# Patient Record
Sex: FEMALE | Race: WHITE | URBAN - METROPOLITAN AREA
[De-identification: names, ages, dates, MRNs, and addresses within clinical notes are randomized per-mention and may not be internally consistent; named-entity substitution may affect disease eponyms.]

---

## 2018-04-13 ENCOUNTER — OFFICE VISIT (OUTPATIENT)
Dept: FAMILY MEDICINE CLINIC | Age: 38
End: 2018-04-13

## 2018-04-13 VITALS
OXYGEN SATURATION: 99 % | RESPIRATION RATE: 20 BRPM | HEART RATE: 78 BPM | SYSTOLIC BLOOD PRESSURE: 110 MMHG | HEIGHT: 62 IN | DIASTOLIC BLOOD PRESSURE: 80 MMHG | TEMPERATURE: 97.9 F | WEIGHT: 174 LBS | BODY MASS INDEX: 32.02 KG/M2

## 2018-04-13 DIAGNOSIS — Z83.49 FAMILY HISTORY OF THYROID DISEASE: ICD-10-CM

## 2018-04-13 DIAGNOSIS — Z00.00 ROUTINE GENERAL MEDICAL EXAMINATION AT A HEALTH CARE FACILITY: ICD-10-CM

## 2018-04-13 DIAGNOSIS — E78.00 PURE HYPERCHOLESTEROLEMIA: ICD-10-CM

## 2018-04-13 DIAGNOSIS — E66.09 CLASS 1 OBESITY DUE TO EXCESS CALORIES WITHOUT SERIOUS COMORBIDITY WITH BODY MASS INDEX (BMI) OF 31.0 TO 31.9 IN ADULT: ICD-10-CM

## 2018-04-13 DIAGNOSIS — E78.00 PURE HYPERCHOLESTEROLEMIA: Primary | ICD-10-CM

## 2018-04-13 DIAGNOSIS — H93.19 TINNITUS, UNSPECIFIED LATERALITY: ICD-10-CM

## 2018-04-13 NOTE — PATIENT INSTRUCTIONS
Neck Strain or Sprain: Rehab Exercises  Your Care Instructions  Here are some examples of typical rehabilitation exercises for your condition. Start each exercise slowly. Ease off the exercise if you start to have pain. Your doctor or physical therapist will tell you when you can start these exercises and which ones will work best for you. How to do the exercises  Neck rotation    1. Sit in a firm chair, or stand up straight. 2. Keeping your chin level, turn your head to the right, and hold for 15 to 30 seconds. 3. Turn your head to the left and hold for 15 to 30 seconds. 4. Repeat 2 to 4 times to each side. Neck stretches    1. Look straight ahead, and tip your right ear to your right shoulder. Do not let your left shoulder rise up as you tip your head to the right. 2. Hold for 15 to 30 seconds. 3. Tilt your head to the left. Do not let your right shoulder rise up as you tip your head to the left. 4. Hold for 15 to 30 seconds. 5. Repeat 2 to 4 times to each side. Forward neck flexion    1. Sit in a firm chair, or stand up straight. 2. Bend your head forward. 3. Hold for 15 to 30 seconds. 4. Repeat 2 to 4 times. Lateral (side) bend strengthening    1. With your right hand, place your first two fingers on your right temple. 2. Start to bend your head to the side while using gentle pressure from your fingers to keep your head from bending. 3. Hold for about 6 seconds. 4. Repeat 8 to 12 times. 5. Switch hands and repeat the same exercise on your left side. Forward bend strengthening    1. Place your first two fingers of either hand on your forehead. 2. Start to bend your head forward while using gentle pressure from your fingers to keep your head from bending. 3. Hold for about 6 seconds. 4. Repeat 8 to 12 times. Neutral position strengthening    1. Using one hand, place your fingertips on the back of your head at the top of your neck.   2. Start to bend your head backward while using gentle pressure from your fingers to keep your head from bending. 3. Hold for about 6 seconds. 4. Repeat 8 to 12 times. Chin tuck    1. Lie on the floor with a rolled-up towel under your neck. Your head should be touching the floor. 2. Slowly bring your chin toward your chest.  3. Hold for a count of 6, and then relax for up to 10 seconds. 4. Repeat 8 to 12 times. Follow-up care is a key part of your treatment and safety. Be sure to make and go to all appointments, and call your doctor if you are having problems. It's also a good idea to know your test results and keep a list of the medicines you take. Where can you learn more? Go to http://ben-rupinder.info/. Enter M679 in the search box to learn more about \"Neck Strain or Sprain: Rehab Exercises. \"  Current as of: March 21, 2017  Content Version: 11.4  © 5964-1822 Healthwise, Incorporated. Care instructions adapted under license by [employer] (which disclaims liability or warranty for this information). If you have questions about a medical condition or this instruction, always ask your healthcare professional. [address] any warranty or liability for your use of this information.

## 2018-04-13 NOTE — PROGRESS NOTES
Maria Del Carmen Aceves is a 44 y.o. female (: 1980) presenting to address:    Chief Complaint   Patient presents with    Cholesterol Problem     Pure hypercholesterolemia       Vitals:    18 0748   BP: 110/80   Pulse: 78   Resp: 20   Temp: 97.9 °F (36.6 °C)   TempSrc: Oral   SpO2: 99%   Weight: 174 lb (78.9 kg)   Height: 5' 2\" (1.575 m)   PainSc:   0 - No pain       Hearing/Vision:      Visual Acuity Screening    Right eye Left eye Both eyes   Without correction: 20/15-1 20/15-1 20/13-1   With correction:          Learning Assessment:     Learning Assessment 2018   PRIMARY LEARNER Patient   HIGHEST LEVEL OF EDUCATION - PRIMARY LEARNER  4 YEARS OF COLLEGE   BARRIERS PRIMARY LEARNER NONE   CO-LEARNER CAREGIVER No   PRIMARY LANGUAGE ENGLISH    NEED No   LEARNER PREFERENCE PRIMARY READING   LEARNING SPECIAL TOPICS none   ANSWERED BY patient   RELATIONSHIP SELF   ASSESSMENT COMMENT n/a     Depression Screening:     PHQ over the last two weeks 2018   Little interest or pleasure in doing things Not at all   Feeling down, depressed or hopeless Not at all   Total Score PHQ 2 0     Fall Risk Assessment:     Fall Risk Assessment, last 12 mths 2018   Able to walk? Yes   Fall in past 12 months? No     Abuse Screening:     Abuse Screening Questionnaire 2018   Do you ever feel afraid of your partner? N   Are you in a relationship with someone who physically or mentally threatens you? N   Is it safe for you to go home? Y     Coordination of Care Questionaire:   1. Have you been to the ER, urgent care clinic since your last visit? Hospitalized since your last visit? YES [facility] 2018    2. Have you seen or consulted any other health care providers outside of the [address] [place] [address] since your last visit? Include any pap smears or colon screening. NO    Advanced Directive:   1. Do you have an Advanced Directive? NO    2. Would you like information on Advanced Directives?  YES

## 2018-04-13 NOTE — MR AVS SNAPSHOT
[address] [address] [facility] [id number] [phone] Patient: Tiffany Rashid MRN: [id number]:[id number] Visit Information Date & Time Provider Department Dept. Phone Encounter #  
 4/13/2018  7:45 AM Irwin Pulido, Brook Bardales Ramírez [phone] Upcoming Health Maintenance Date Due Influenza Age 5 to Adult 9/1/2018* PAP AKA CERVICAL CYTOLOGY 4/1/2021 DTaP/Tdap/Td series (2 - Td) 1/1/2024 *Topic was postponed. The date shown is not the original due date. Allergies as of 4/13/2018  Review Complete On: 4/13/2018 By: Irwin Pulido MD  
 No Known Allergies Current Immunizations  Reviewed on 8/22/2012 Name Date Hepatitis B Vaccine 4/22/2012 Influenza Vaccine Whole 8/22/2011 PPD 8/22/2004 TD Vaccine 8/22/2010 Not reviewed this visit You Were Diagnosed With   
  
 Codes Comments Pure hypercholesterolemia    -  Primary ICD-10-CM: E78.00 ICD-9-CM: 272.0 Routine general medical examination at a health care facility     ICD-10-CM: Z00.00 ICD-9-CM: V70.0 Class 1 obesity due to excess calories without serious comorbidity with body mass index (BMI) of 31.0 to 31.9 in adult     ICD-10-CM: E66.09, Z68.31 
ICD-9-CM: 278.00, V85.31 Tinnitus, unspecified laterality     ICD-10-CM: H93.19 ICD-9-CM: 388.30 Vitals BP Pulse Temp Resp Height(growth percentile) Weight(growth percentile) 110/80 (BP 1 Location: Left arm, BP Patient Position: Sitting) 78 97.9 °F (36.6 °C) (Oral) 20 5' 2\" (1.575 m) 174 lb (78.9 kg) SpO2 BMI OB Status Smoking Status 99% 31.83 kg/m2 IUD Never Smoker Vitals History BMI and BSA Data Body Mass Index Body Surface Area  
 31.83 kg/m 2 1.86 m 2 Preferred Pharmacy Pharmacy Name Phone [facility] [address] [place] [address] [facility] [phone] Your Updated Medication List  
  
Notice  As of 4/13/2018  8:10 AM  
 You have not been prescribed any medications. We Performed the Following REFERRAL TO ENT-OTOLARYNGOLOGY [TYA31 Custom] To-Do List   
 04/13/2018 Lab:  CBC W/O DIFF   
  
 04/13/2018 Lab:  LIPID PANEL   
  
 04/13/2018 Lab:  METABOLIC PANEL, COMPREHENSIVE Referral Information Referral ID Referred By Referred To  
  
 [id number] [address] Sanchez [facility] 2018 [address] Phone: [phone] Fax: [phone] Visits Status Start Date End Date 1 New Request 4/13/18 4/13/19 If your referral has a status of pending review or denied, additional information will be sent to support the outcome of this decision. Patient Instructions Neck Strain or Sprain: Rehab Exercises Your Care Instructions Here are some examples of typical rehabilitation exercises for your condition. Start each exercise slowly. Ease off the exercise if you start to have pain. Your doctor or physical therapist will tell you when you can start these exercises and which ones will work best for you. How to do the exercises Neck rotation 1. Sit in a firm chair, or stand up straight. 2. Keeping your chin level, turn your head to the right, and hold for 15 to 30 seconds. 3. Turn your head to the left and hold for 15 to 30 seconds. 4. Repeat 2 to 4 times to each side. Neck stretches 1. Look straight ahead, and tip your right ear to your right shoulder. Do not let your left shoulder rise up as you tip your head to the right. 2. Hold for 15 to 30 seconds. 3. Tilt your head to the left. Do not let your right shoulder rise up as you tip your head to the left. 4. Hold for 15 to 30 seconds. 5. Repeat 2 to 4 times to each side. Forward neck flexion 1. Sit in a firm chair, or stand up straight. 2. Bend your head forward. 3. Hold for 15 to 30 seconds. 4. Repeat 2 to 4 times. Lateral (side) bend strengthening 1. With your right hand, place your first two fingers on your right temple. 2. Start to bend your head to the side while using gentle pressure from your fingers to keep your head from bending. 3. Hold for about 6 seconds. 4. Repeat 8 to 12 times. 5. Switch hands and repeat the same exercise on your left side. Forward bend strengthening 1. Place your first two fingers of either hand on your forehead. 2. Start to bend your head forward while using gentle pressure from your fingers to keep your head from bending. 3. Hold for about 6 seconds. 4. Repeat 8 to 12 times. Neutral position strengthening 1. Using one hand, place your fingertips on the back of your head at the top of your neck. 2. Start to bend your head backward while using gentle pressure from your fingers to keep your head from bending. 3. Hold for about 6 seconds. 4. Repeat 8 to 12 times. Chin tuck 1. Lie on the floor with a rolled-up towel under your neck. Your head should be touching the floor. 2. Slowly bring your chin toward your chest. 
3. Hold for a count of 6, and then relax for up to 10 seconds. 4. Repeat 8 to 12 times. Follow-up care is a key part of your treatment and safety. Be sure to make and go to all appointments, and call your doctor if you are having problems. It's also a good idea to know your test results and keep a list of the medicines you take. Where can you learn more? Go to http://ben-rupinder.info/. Enter M679 in the search box to learn more about \"Neck Strain or Sprain: Rehab Exercises. \" Current as of: March 21, 2017 Content Version: 11.4 © 5783-0943 Healthwise, Incorporated. Care instructions adapted under license by [employer] (which disclaims liability or warranty for this information).  If you have questions about a medical condition or this instruction, always ask your healthcare professional. Nikita Burleson any warranty or liability for your use of this information. Introducing \[facility]\"" & HEALTH SERVICES! Dear Nargis Morgan: Thank you for requesting a [employer] account. Our records indicate that you already have an active [employer] account. You can access your account anytime at [email or web address] Did you know that you can access your hospital and ER discharge instructions at any time in [employer]? You can also review all of your test results from your hospital stay or ER visit. Additional Information If you have questions, please visit the Frequently Asked Questions section of the [employer] website at [email or web address] Remember, [employer] is NOT to be used for urgent needs. For medical emergencies, dial 911. Now available from your iPhone and Android! Please provide this summary of care documentation to your next provider. Your primary care clinician is listed as Macrina Brown. If you have any questions after today's visit, please call [phone].
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
## 2018-09-26 LAB
A-G RATIO,AGRAT: 2.1 RATIO (ref 1.1–2.6)
ALBUMIN SERPL-MCNC: 4.5 G/DL (ref 3.5–5)
ALP SERPL-CCNC: 56 U/L (ref 25–115)
ALT SERPL-CCNC: 16 U/L (ref 5–40)
ANION GAP SERPL CALC-SCNC: 17 MMOL/L
AST SERPL W P-5'-P-CCNC: 16 U/L (ref 10–37)
BILIRUB SERPL-MCNC: 0.3 MG/DL (ref 0.2–1.2)
BUN SERPL-MCNC: 16 MG/DL (ref 6–22)
CALCIUM SERPL-MCNC: 9.4 MG/DL (ref 8.4–10.5)
CHLORIDE SERPL-SCNC: 100 MMOL/L (ref 98–110)
CHOLEST SERPL-MCNC: 208 MG/DL (ref 110–200)
CO2 SERPL-SCNC: 24 MMOL/L (ref 20–32)
CREAT SERPL-MCNC: 0.8 MG/DL (ref 0.5–1.2)
ERYTHROCYTE [DISTWIDTH] IN BLOOD BY AUTOMATED COUNT: 12.6 % (ref 10–15.5)
GFRAA, 66117: >60
GFRNA, 66118: >60
GLOBULIN,GLOB: 2.1 G/DL (ref 2–4)
GLUCOSE SERPL-MCNC: 83 MG/DL (ref 70–99)
HCT VFR BLD AUTO: 42 % (ref 35.1–46.5)
HDLC SERPL-MCNC: 3.2 MG/DL (ref 0–5)
HDLC SERPL-MCNC: 65 MG/DL (ref 40–59)
HGB BLD-MCNC: 13.5 G/DL (ref 11.7–15.5)
LDLC SERPL CALC-MCNC: 134 MG/DL (ref 50–99)
MCH RBC QN AUTO: 31 PG (ref 26–34)
MCHC RBC AUTO-ENTMCNC: 32 G/DL (ref 31–36)
MCV RBC AUTO: 97 FL (ref 80–95)
PLATELET # BLD AUTO: 186 K/UL (ref 140–440)
PMV BLD AUTO: 13.4 FL (ref 9–13)
POTASSIUM SERPL-SCNC: 4.2 MMOL/L (ref 3.5–5.5)
PROT SERPL-MCNC: 6.6 G/DL (ref 6.4–8.3)
RBC # BLD AUTO: 4.34 M/UL (ref 3.8–5.2)
SODIUM SERPL-SCNC: 141 MMOL/L (ref 133–145)
TRIGL SERPL-MCNC: 45 MG/DL (ref 40–149)
TSH SERPL DL<=0.005 MIU/L-ACNC: 0.64 MCU/ML (ref 0.27–4.2)
VLDLC SERPL CALC-MCNC: 9 MG/DL (ref 8–30)
WBC # BLD AUTO: 5.3 K/UL (ref 4–11)

## 2018-11-01 ENCOUNTER — OFFICE VISIT (OUTPATIENT)
Dept: FAMILY MEDICINE CLINIC | Age: 38
End: 2018-11-01

## 2018-11-01 VITALS
BODY MASS INDEX: 30.55 KG/M2 | HEART RATE: 78 BPM | HEIGHT: 62 IN | WEIGHT: 166 LBS | TEMPERATURE: 97.6 F | OXYGEN SATURATION: 99 % | RESPIRATION RATE: 20 BRPM | SYSTOLIC BLOOD PRESSURE: 100 MMHG | DIASTOLIC BLOOD PRESSURE: 78 MMHG

## 2018-11-01 DIAGNOSIS — J01.00 ACUTE NON-RECURRENT MAXILLARY SINUSITIS: Primary | ICD-10-CM

## 2018-11-01 RX ORDER — FLUCONAZOLE 150 MG/1
150 TABLET ORAL DAILY
Qty: 1 TAB | Refills: 0 | Status: SHIPPED | OUTPATIENT
Start: 2018-11-01 | End: 2018-11-02

## 2018-11-01 RX ORDER — AMOXICILLIN AND CLAVULANATE POTASSIUM 500; 125 MG/1; MG/1
1 TABLET, FILM COATED ORAL 2 TIMES DAILY
Qty: 14 TAB | Refills: 0 | Status: SHIPPED | OUTPATIENT
Start: 2018-11-01 | End: 2018-11-08

## 2018-11-01 NOTE — PATIENT INSTRUCTIONS
Sinusitis: Care Instructions Your Care Instructions Sinusitis is an infection of the lining of the sinus cavities in your head. Sinusitis often follows a cold. It causes pain and pressure in your head and face. In most cases, sinusitis gets better on its own in 1 to 2 weeks. But some mild symptoms may last for several weeks. Sometimes antibiotics are needed. Follow-up care is a key part of your treatment and safety. Be sure to make and go to all appointments, and call your doctor if you are having problems. It's also a good idea to know your test results and keep a list of the medicines you take. How can you care for yourself at home? · Take an over-the-counter pain medicine, such as acetaminophen (Tylenol), ibuprofen (Advil, Motrin), or naproxen (Aleve). Read and follow all instructions on the label. · If the doctor prescribed antibiotics, take them as directed. Do not stop taking them just because you feel better. You need to take the full course of antibiotics. · Be careful when taking over-the-counter cold or flu medicines and Tylenol at the same time. Many of these medicines have acetaminophen, which is Tylenol. Read the labels to make sure that you are not taking more than the recommended dose. Too much acetaminophen (Tylenol) can be harmful. · Breathe warm, moist air from a steamy shower, a hot bath, or a sink filled with hot water. Avoid cold, dry air. Using a humidifier in your home may help. Follow the directions for cleaning the machine. · Use saline (saltwater) nasal washes to help keep your nasal passages open and wash out mucus and bacteria. You can buy saline nose drops at a grocery store or drugstore. Or you can make your own at home by adding 1 teaspoon of salt and 1 teaspoon of baking soda to 2 cups of distilled water. If you make your own, fill a bulb syringe with the solution, insert the tip into your nostril, and squeeze gently. Jeri Shaw your nose. · Put a hot, wet towel or a warm gel pack on your face 3 or 4 times a day for 5 to 10 minutes each time. · Try a decongestant nasal spray like oxymetazoline (Afrin). Do not use it for more than 3 days in a row. Using it for more than 3 days can make your congestion worse. When should you call for help? Call your doctor now or seek immediate medical care if: 
  · You have new or worse swelling or redness in your face or around your eyes.  
  · You have a new or higher fever.  
 Watch closely for changes in your health, and be sure to contact your doctor if: 
  · You have new or worse facial pain.  
  · The mucus from your nose becomes thicker (like pus) or has new blood in it.  
  · You are not getting better as expected. Where can you learn more? Go to http://ben-rupinder.info/. Enter C758 in the search box to learn more about \"Sinusitis: Care Instructions. \" Current as of: March 28, 2018 Content Version: 11.8 © 3294-9004 [employer]. Care instructions adapted under license by [employer] (which disclaims liability or warranty for this information). If you have questions about a medical condition or this instruction, always ask your healthcare professional. [address] any warranty or liability for your use of this information.

## 2018-11-01 NOTE — PROGRESS NOTES
Chief Complaint Patient presents with  Sinus Infection Assessment/Plan 1. Acute non-recurrent maxillary sinusitis 
-augmentin The plan was discussed with the patient. The patient verbalized understanding and is in agreement with the plan. All medication potential side effects were discussed with the patient. SUBJECTIVE:  
Nicole Nicholson is a 43 y.o. female who complains of 3 day h/o vocal hoarsenes, sinus pain, nasal congestion, frontal HA, productive cough. +chills. Review of Systems - ENT ROS: positive for - headaches, nasal congestion and sinus pain Respiratory ROS: positive for - cough Cardiovascular ROS: no chest pain or dyspnea on exertion Gastrointestinal ROS: no abdominal pain, change in bowel habits, or black or bloody stools Musculoskeletal ROS: negative Neurological ROS: negative Physical Examination:  
Visit Vitals /78 (BP 1 Location: Left arm, BP Patient Position: Sitting) Pulse 78 Temp 97.6 °F (36.4 °C) (Oral) Resp 20 Ht 5' 2\" (1.575 m) Wt 166 lb (75.3 kg) SpO2 99% BMI 30.36 kg/m² Constitutional: Well developed, nourished, no distress, alert HENT: Exterior ears and tympanic membranes normal bilaterally. Supple neck. +cervical lymphadenopathy. Oropharynx clear and moist mucous membranes. Eyes: Conjunctiva normal. PERRL. CV: S1, S2.  RRR. No murmurs/rubs. No thrills palpated. No carotid bruits. Intact distal pulses. No edema. Pulm: No abnormalities on inspection. Clear to auscultation bilaterally. No wheezing/rhonchi. Normal effort.     
 
 
 
 
Anderson Brown MD

## 2018-11-01 NOTE — PROGRESS NOTES
Elise Tellez is a 43 y.o. female (: 1980) presenting to address: Chief Complaint Patient presents with  Sinus Infection Vitals:  
 18 1416 BP: 100/78 Pulse: 78 Resp: 20 Temp: 97.6 °F (36.4 °C) TempSrc: Oral  
SpO2: 99% Weight: 166 lb (75.3 kg) Height: 5' 2\" (1.575 m) PainSc:   3 PainLoc: Head Learning Assessment:  
 
Learning Assessment 2018 PRIMARY LEARNER Patient HIGHEST LEVEL OF EDUCATION - PRIMARY LEARNER  4 YEARS OF COLLEGE  
BARRIERS PRIMARY LEARNER NONE  
CO-LEARNER CAREGIVER No  
PRIMARY LANGUAGE ENGLISH  NEED No  
LEARNER PREFERENCE PRIMARY READING  
LEARNING SPECIAL TOPICS none ANSWERED BY patient RELATIONSHIP SELF  
ASSESSMENT COMMENT n/a Depression Screening: PHQ over the last two weeks 2018 Little interest or pleasure in doing things Not at all Feeling down, depressed, irritable, or hopeless Not at all Total Score PHQ 2 0 Fall Risk Assessment:  
 
Fall Risk Assessment, last 12 mths 2018 Able to walk? Yes Fall in past 12 months? No  
 
Abuse Screening:  
 
Abuse Screening Questionnaire 2018 Do you ever feel afraid of your partner? Rachel Franain Are you in a relationship with someone who physically or mentally threatens you? Rachel Merchant Is it safe for you to go home? Ildefonso Sung Coordination of Care Questionaire: 1. Have you been to the ER, urgent care clinic since your last visit? Hospitalized since your last visit? NO 
 
2. Have you seen or consulted any other health care providers outside of the [address] [place] [address] since your last visit? Include any pap smears or colon screening. NO Advanced Directive: 1. Do you have an Advanced Directive? YES 
 
2. Would you like information on Advanced Directives?  NO

## 2019-01-22 ENCOUNTER — OFFICE VISIT (OUTPATIENT)
Dept: FAMILY MEDICINE CLINIC | Age: 39
End: 2019-01-22

## 2019-01-22 VITALS
HEIGHT: 62 IN | BODY MASS INDEX: 31.1 KG/M2 | TEMPERATURE: 98.2 F | RESPIRATION RATE: 18 BRPM | SYSTOLIC BLOOD PRESSURE: 110 MMHG | OXYGEN SATURATION: 99 % | HEART RATE: 69 BPM | DIASTOLIC BLOOD PRESSURE: 80 MMHG | WEIGHT: 169 LBS

## 2019-01-22 DIAGNOSIS — E66.09 CLASS 1 OBESITY DUE TO EXCESS CALORIES WITHOUT SERIOUS COMORBIDITY WITH BODY MASS INDEX (BMI) OF 30.0 TO 30.9 IN ADULT: Primary | ICD-10-CM

## 2019-01-22 RX ORDER — ST. JOHN'S WORT 300 MG
CAPSULE ORAL AS NEEDED
COMMUNITY

## 2019-01-22 RX ORDER — BUPROPION HYDROCHLORIDE 150 MG/1
TABLET ORAL
Qty: 60 TAB | Refills: 0 | Status: SHIPPED | OUTPATIENT
Start: 2019-01-22 | End: 2019-03-06 | Stop reason: SDUPTHER

## 2019-01-22 RX ORDER — LANOLIN ALCOHOL/MO/W.PET/CERES
1 CREAM (GRAM) TOPICAL DAILY
COMMUNITY

## 2019-01-22 RX ORDER — NALTREXONE HYDROCHLORIDE 50 MG/1
TABLET, FILM COATED ORAL
Qty: 30 TAB | Refills: 0 | Status: SHIPPED | OUTPATIENT
Start: 2019-01-22 | End: 2019-03-06 | Stop reason: SDUPTHER

## 2019-01-22 NOTE — PROGRESS NOTES
Roshan Hall is a 43 y.o. female (: 1980) presenting to address: Chief Complaint Patient presents with  Medication Evaluation Phentermine Vitals:  
 19 1139 BP: 110/80 Pulse: 69 Resp: 18 Temp: 98.2 °F (36.8 °C) TempSrc: Oral  
SpO2: 99% Weight: 169 lb (76.7 kg) Height: 5' 2\" (1.575 m) PainSc:   0 - No pain Learning Assessment:  
 
Learning Assessment 2018 PRIMARY LEARNER Patient HIGHEST LEVEL OF EDUCATION - PRIMARY LEARNER  4 YEARS OF COLLEGE  
BARRIERS PRIMARY LEARNER NONE  
CO-LEARNER CAREGIVER No  
PRIMARY LANGUAGE ENGLISH  NEED No  
LEARNER PREFERENCE PRIMARY READING  
LEARNING SPECIAL TOPICS none ANSWERED BY patient RELATIONSHIP SELF  
ASSESSMENT COMMENT n/a Depression Screening: PHQ over the last two weeks 2019 Little interest or pleasure in doing things Not at all Feeling down, depressed, irritable, or hopeless Not at all Total Score PHQ 2 0 Fall Risk Assessment:  
 
Fall Risk Assessment, last 12 mths 2018 Able to walk? Yes Fall in past 12 months? No  
 
Abuse Screening:  
 
Abuse Screening Questionnaire 2018 Do you ever feel afraid of your partner? Cristhian Rivera Are you in a relationship with someone who physically or mentally threatens you? Cristhian Rivera Is it safe for you to go home? Rocky Bose Coordination of Care Questionaire: 1. Have you been to the ER, urgent care clinic since your last visit? Hospitalized since your last visit? NO 
 
2. Have you seen or consulted any other health care providers outside of the [address] [place] [address] since your last visit? Include any pap smears or colon screening. NO Advanced Directive: 1. Do you have an Advanced Directive? YES 
 
2. Would you like information on Advanced Directives?  NO

## 2019-01-22 NOTE — PROGRESS NOTES
Assessment/Plan: 1. Class 1 obesity due to excess calories without serious comorbidity with body mass index (BMI) of 30.0 to 30.9 in adult 
-after discussing pro/cons, pt opted not to do phentermine, rather will do buproprion + naltrexone. F/u in 6weeks The plan was discussed with the patient. The patient verbalized understanding and is in agreement with the plan. All medication potential side effects were discussed with the patient. Health Maintenance:  
Health Maintenance Topic Date Due  
 PAP AKA CERVICAL CYTOLOGY  04/01/2021  
 DTaP/Tdap/Td series (2 - Td) 05/21/2024  Influenza Age 5 to Adult  Completed Heydi Figures is a 43 y.o. female and presents with Medication Evaluation (Phentermine) Subjective: 
Pt is inquiring about phentermine for wt loss. States she's been struggling with wt loss. Has failed wt watchers. She is exercising regularly. She doesn't eat late. Her weakness is snacking around 3pm.  She states stimulating meds (like hydroxycut) causes her to be irritable. ROS: 
Constitutional: No recent weight change. No weakness/fatigue. No f/c. Cardiovascular: No CP/palpitations. No ROMAN/orthopnea/PND. Respiratory: No cough/sputum, dyspnea, wheezing. Gastointestinal: No dysphagia, reflux. No n/v. No constipation/diarrhea. No melena/rectal bleeding. The problem list was updated as a part of today's visit. Patient Active Problem List  
Diagnosis Code  Hypertension in pregnancy, preeclampsia/eclampsia/prior HTN [id number]  Pure hypercholesterolemia E78.00  
 IUD (intrauterine device) in place Z97.5 The PSH, FH were reviewed. SH: Social History Tobacco Use  Smoking status: Never Smoker  Smokeless tobacco: Never Used Substance Use Topics  Alcohol use: Yes  Drug use: No  
 
 
Medications/Allergies: 
Current Outpatient Medications on File Prior to Visit Medication Sig Dispense Refill  levonorgestrel (MIRENA) 20 mcg/24 hr (5 years) IUD 1 Device by IntraUTERine route once.  sunitha's wort 300 mg cap Take  by mouth.  Omega-3 Fatty Acids (FISH OIL) 500 mg cap Take  by mouth.  glucosamine-chondroitin (ARTHX) 500-400 mg cap Take 1 Cap by mouth daily.  pseudoeph/DM/guaifen/acetamin (VICKS DAYQUIL LIQUICAPS PO) Take  by mouth daily as needed. No current facility-administered medications on file prior to visit. No Known Allergies Objective: 
Visit Vitals /80 (BP 1 Location: Right arm, BP Patient Position: Sitting) Pulse 69 Temp 98.2 °F (36.8 °C) (Oral) Resp 18 Ht 5' 2\" (1.575 m) Wt 169 lb (76.7 kg) SpO2 99% BMI 30.91 kg/m² Constitutional: Well developed, nourished, no distress, alert, obese habitus CV: S1, S2.  RRR. No murmurs/rubs. No thrills palpated. No carotid bruits. Intact distal pulses. No edema. No aortic bruits. Pulm: No abnormalities on inspection. Clear to auscultation bilaterally. No wheezing/rhonchi. Normal effort. Psych: Appropriate affect, judgement and insight. Short-term memory intact. Labwork and Ancillary Studies: CBC w/Diff Lab Results Component Value Date/Time WBC 5.3 09/26/2018 08:34 AM  
 HGB 13.5 09/26/2018 08:34 AM  
 PLATELET 560 22/78/8737 08:34 AM  
  
 
 Basic Metabolic Profile/LFTs Lab Results Component Value Date/Time Sodium 141 09/26/2018 08:34 AM  
 Potassium 4.2 09/26/2018 08:34 AM  
 Chloride 100 09/26/2018 08:34 AM  
 CO2 24 09/26/2018 08:34 AM  
 Anion gap 17.0 09/26/2018 08:34 AM  
 Glucose 83 09/26/2018 08:34 AM  
 BUN 16 09/26/2018 08:34 AM  
 Creatinine 0.8 09/26/2018 08:34 AM  
 GFR est AA >60.0 01/14/2014 01:00 PM  
 GFR est non-AA >60 01/14/2014 01:00 PM  
 Calcium 9.4 09/26/2018 08:34 AM  
  
Lab Results Component Value Date/Time ALT (SGPT) 16 09/26/2018 08:34 AM  
 AST (SGOT) 16 09/26/2018 08:34 AM  
 Alk.  phosphatase 56 09/26/2018 08:34 AM  
 Bilirubin, total 0.3 09/26/2018 08:34 AM  
 
 
Cholesterol Lab Results Component Value Date/Time  Cholesterol, total 208 (H) 09/26/2018 08:34 AM  
 HDL Cholesterol 65 (H) 09/26/2018 08:34 AM  
 LDL, calculated 134 (H) 09/26/2018 08:34 AM  
 Triglyceride 45 09/26/2018 08:34 AM

## 2019-03-06 ENCOUNTER — OFFICE VISIT (OUTPATIENT)
Dept: FAMILY MEDICINE CLINIC | Age: 39
End: 2019-03-06

## 2019-03-06 VITALS
BODY MASS INDEX: 29.63 KG/M2 | RESPIRATION RATE: 16 BRPM | WEIGHT: 161 LBS | HEIGHT: 62 IN | SYSTOLIC BLOOD PRESSURE: 120 MMHG | TEMPERATURE: 97.9 F | DIASTOLIC BLOOD PRESSURE: 70 MMHG | OXYGEN SATURATION: 98 % | HEART RATE: 71 BPM

## 2019-03-06 DIAGNOSIS — E66.3 OVERWEIGHT (BMI 25.0-29.9): Primary | ICD-10-CM

## 2019-03-06 RX ORDER — BUPROPION HYDROCHLORIDE 150 MG/1
150 TABLET ORAL 2 TIMES DAILY
Qty: 180 TAB | Refills: 1 | Status: SHIPPED | OUTPATIENT
Start: 2019-03-06 | End: 2020-02-04

## 2019-03-06 RX ORDER — NALTREXONE HYDROCHLORIDE 50 MG/1
25 TABLET, FILM COATED ORAL 2 TIMES DAILY
Qty: 90 TAB | Refills: 0 | Status: SHIPPED | OUTPATIENT
Start: 2019-03-06 | End: 2020-02-04

## 2019-03-06 NOTE — PROGRESS NOTES
Assessment/Plan:    1. Overweight (BMI 25.0-29.9)  -has done very well. Refilled 3 mos  - buPROPion XL (WELLBUTRIN XL) 150 mg tablet; Take 1 Tab by mouth two (2) times a day. Dispense: 180 Tab; Refill: 1  - naltrexone (DEPADE) 50 mg tablet; Take 0.5 Tabs by mouth two (2) times a day. Dispense: 90 Tab; Refill: 0      The plan was discussed with the patient. The patient verbalized understanding and is in agreement with the plan. All medication potential side effects were discussed with the patient. Health Maintenance:   Health Maintenance   Topic Date Due    PAP AKA CERVICAL CYTOLOGY  04/01/2021    DTaP/Tdap/Td series (3 - Td) 05/21/2024    Influenza Age 5 to Adult  Completed       Tay Tabares is a 43 y.o. female and presents with Weight Management     Subjective:  Obesity - has lost 8 lb on wellbutrin and naltrexone. No side effects. Is doing weight watchers. ROS:  Constitutional: No recent weight change. No weakness/fatigue. No f/c. Cardiovascular: No CP/palpitations. No ROMAN/orthopnea/PND. Respiratory: No cough/sputum, dyspnea, wheezing. The problem list was updated as a part of today's visit. Patient Active Problem List   Diagnosis Code    Hypertension in pregnancy, preeclampsia/eclampsia/prior HTN [id number]    Pure hypercholesterolemia E78.00    IUD (intrauterine device) in place Z97.5       The PSH, FH were reviewed. SH:  Social History     Tobacco Use    Smoking status: Never Smoker    Smokeless tobacco: Never Used   Substance Use Topics    Alcohol use: Yes    Drug use: No       Medications/Allergies:  Current Outpatient Medications on File Prior to Visit   Medication Sig Dispense Refill    levonorgestrel (MIRENA) 20 mcg/24 hr (5 years) IUD 1 Device by IntraUTERine route once.  sunitha's wort 300 mg cap Take  by mouth.  Omega-3 Fatty Acids (FISH OIL) 500 mg cap Take  by mouth.  glucosamine-chondroitin (ARTHX) 500-400 mg cap Take 1 Cap by mouth daily.       buPROPion XL (WELLBUTRIN XL) 150 mg tablet Take 1/2 tab po q day x 1 week, then increase to 1 tab po dailyx 1 week, then 1 tab po bid (Patient taking differently: 150 mg two (2) times a day. Take 1/2 tab po q day x 1 week, then increase to 1 tab po dailyx 1 week, then 1 tab po bid) 60 Tab 0    pseudoeph/DM/guaifen/acetamin (VICKS DAYQUIL LIQUICAPS PO) Take  by mouth daily as needed.  naltrexone (DEPADE) 50 mg tablet Take 1/2 tab po daily x 1 week, then increase to 1/2 tab po bid (Patient taking differently: 25 mg two (2) times a day. Take 1/2 tab po daily x 1 week, then increase to 1/2 tab po bid) 30 Tab 0     No current facility-administered medications on file prior to visit. No Known Allergies    Objective:  Visit Vitals  /70 (BP 1 Location: Left arm, BP Patient Position: Sitting)   Pulse 71   Temp 97.9 °F (36.6 °C) (Oral)   Resp 16   Ht 5' 2\" (1.575 m)   Wt 161 lb (73 kg)   SpO2 98%   BMI 29.45 kg/m²      Constitutional: Well developed, nourished, no distress, alert   CV: S1, S2.  RRR. No murmurs/rubs. No thrills palpated. No carotid bruits. Intact distal pulses. No edema. No aortic bruits. Pulm: No abnormalities on inspection. Clear to auscultation bilaterally. No wheezing/rhonchi. Normal effort.

## 2019-03-06 NOTE — PROGRESS NOTES
Faith Leonardo is a 43 y.o. female (: 1980) presenting to address:    Chief Complaint   Patient presents with    Weight Management       Vitals:    19 1135   BP: 120/70   Pulse: 71   Resp: 16   Temp: 97.9 °F (36.6 °C)   TempSrc: Oral   SpO2: 98%   Weight: 161 lb (73 kg)   Height: 5' 2\" (1.575 m)   PainSc:   0 - No pain       Hearing/Vision:   No exam data present    Learning Assessment:     Learning Assessment 2018   PRIMARY LEARNER Patient   HIGHEST LEVEL OF EDUCATION - PRIMARY LEARNER  4 YEARS OF COLLEGE   BARRIERS PRIMARY LEARNER NONE   CO-LEARNER CAREGIVER No   PRIMARY LANGUAGE ENGLISH    NEED No   LEARNER PREFERENCE PRIMARY READING   LEARNING SPECIAL TOPICS none   ANSWERED BY patient   RELATIONSHIP SELF   ASSESSMENT COMMENT n/a     Depression Screening:     3 most recent PHQ Screens 2019   Little interest or pleasure in doing things Not at all   Feeling down, depressed, irritable, or hopeless Not at all   Total Score PHQ 2 0     Fall Risk Assessment:     Fall Risk Assessment, last 12 mths 2018   Able to walk? Yes   Fall in past 12 months? No     Abuse Screening:     Abuse Screening Questionnaire 2018   Do you ever feel afraid of your partner? N   Are you in a relationship with someone who physically or mentally threatens you? N   Is it safe for you to go home? Y     Coordination of Care Questionaire:   1. Have you been to the ER, urgent care clinic since your last visit? Hospitalized since your last visit? NO    2. Have you seen or consulted any other health care providers outside of the [address] [place] [address] since your last visit? Include any pap smears or colon screening. NO    Advanced Directive:   1. Do you have an Advanced Directive? NO    2. Would you like information on Advanced Directives?  NO

## 2020-02-04 ENCOUNTER — OFFICE VISIT (OUTPATIENT)
Dept: FAMILY MEDICINE CLINIC | Age: 40
End: 2020-02-04

## 2020-02-04 VITALS
HEART RATE: 79 BPM | DIASTOLIC BLOOD PRESSURE: 70 MMHG | WEIGHT: 177.2 LBS | SYSTOLIC BLOOD PRESSURE: 110 MMHG | OXYGEN SATURATION: 98 % | HEIGHT: 62 IN | TEMPERATURE: 98.2 F | RESPIRATION RATE: 14 BRPM | BODY MASS INDEX: 32.61 KG/M2

## 2020-02-04 DIAGNOSIS — R68.89 FLU-LIKE SYMPTOMS: Primary | ICD-10-CM

## 2020-02-04 DIAGNOSIS — J11.1 INFLUENZA: ICD-10-CM

## 2020-02-04 LAB
FLUAV+FLUBV AG NOSE QL IA.RAPID: NEGATIVE POS/NEG
FLUAV+FLUBV AG NOSE QL IA.RAPID: POSITIVE POS/NEG
VALID INTERNAL CONTROL?: YES

## 2020-02-04 RX ORDER — OSELTAMIVIR PHOSPHATE 75 MG/1
75 CAPSULE ORAL 2 TIMES DAILY
Qty: 10 CAP | Refills: 0 | Status: SHIPPED | OUTPATIENT
Start: 2020-02-04 | End: 2020-02-09

## 2020-02-04 NOTE — PATIENT INSTRUCTIONS
Rest, increase fluids, Take OTC acetaminophen and/or ibuprofen as needed for pain and fever. Do not take aspirin. Take oseltamivir twice daily x 5 days Follow-up for new symptoms worsening symptoms or failure to improve. Influenza (Flu): Care Instructions Your Care Instructions Influenza (flu) is an infection in the lungs and breathing passages. It is caused by the influenza virus. There are different strains, or types, of the flu virus from year to year. Unlike the common cold, the flu comes on suddenly and the symptoms, such as a cough, congestion, fever, chills, fatigue, aches, and pains, are more severe. These symptoms may last up to 10 days. Although the flu can make you feel very sick, it usually doesn't cause serious health problems. Home treatment is usually all you need for flu symptoms. But your doctor may prescribe antiviral medicine to prevent other health problems, such as pneumonia, from developing. Older people and those who have a long-term health condition, such as lung disease, are most at risk for having pneumonia or other health problems. Follow-up care is a key part of your treatment and safety. Be sure to make and go to all appointments, and call your doctor if you are having problems. It's also a good idea to know your test results and keep a list of the medicines you take. How can you care for yourself at home? · Get plenty of rest. 
· Drink plenty of fluids, enough so that your urine is light yellow or clear like water. If you have kidney, heart, or liver disease and have to limit fluids, talk with your doctor before you increase the amount of fluids you drink. · Take an over-the-counter pain medicine if needed, such as acetaminophen (Tylenol), ibuprofen (Advil, Motrin), or naproxen (Aleve), to relieve fever, headache, and muscle aches. Read and follow all instructions on the label. No one younger than 20 should take aspirin.  It has been linked to Reye syndrome, a serious illness. · Do not smoke. Smoking can make the flu worse. If you need help quitting, talk to your doctor about stop-smoking programs and medicines. These can increase your chances of quitting for good. · Breathe moist air from a hot shower or from a sink filled with hot water to help clear a stuffy nose. · Before you use cough and cold medicines, check the label. These medicines may not be safe for young children or for people with certain health problems. · If the skin around your nose and lips becomes sore, put some petroleum jelly on the area. · To ease coughing: ? Drink fluids to soothe a scratchy throat. ? Suck on cough drops or plain hard candy. ? Take an over-the-counter cough medicine that contains dextromethorphan to help you get some sleep. Read and follow all instructions on the label. ? Raise your head at night with an extra pillow. This may help you rest if coughing keeps you awake. · Take any prescribed medicine exactly as directed. Call your doctor if you think you are having a problem with your medicine. To avoid spreading the flu · Wash your hands regularly, and keep your hands away from your face. · Stay home from school, work, and other public places until you are feeling better and your fever has been gone for at least 24 hours. The fever needs to have gone away on its own without the help of medicine. · Ask people living with you to talk to their doctors about preventing the flu. They may get antiviral medicine to keep from getting the flu from you. · To prevent the flu in the future, get a flu vaccine every fall. Encourage people living with you to get the vaccine. · Cover your mouth when you cough or sneeze. When should you call for help? Call 911 anytime you think you may need emergency care. For example, call if: 
  · You have severe trouble breathing.  
 Call your doctor now or seek immediate medical care if:   · You have new or worse trouble breathing.  
  · You seem to be getting much sicker.  
  · You feel very sleepy or confused.  
  · You have a new or higher fever.  
  · You get a new rash.  
 Watch closely for changes in your health, and be sure to contact your doctor if: 
  · You begin to get better and then get worse.  
  · You are not getting better after 1 week. Where can you learn more? Go to http://ben-rupinder.info/. Enter B495 in the search box to learn more about \"Influenza (Flu): Care Instructions. \" Current as of: June 9, 2019 Content Version: 12.2 © 9641-8128 [employer]. Care instructions adapted under license by [employer] (which disclaims liability or warranty for this information). If you have questions about a medical condition or this instruction, always ask your healthcare professional. [address] any warranty or liability for your use of this information.

## 2020-02-04 NOTE — PROGRESS NOTES
Sheila Alejandro is a 35 y.o. female (: 1980) presenting to address:    Chief Complaint   Patient presents with    Cough     congestion; received flu vaccine       Vitals:    20 1503   BP: 110/70   Pulse: 79   Resp: 14   Temp: 98.2 °F (36.8 °C)   TempSrc: Oral   SpO2: 98%   Weight: 177 lb 3.2 oz (80.4 kg)   Height: 5' 2\" (1.575 m)   PainSc:   3   PainLoc: Generalized       Hearing/Vision:   No exam data present    Learning Assessment:     Learning Assessment 2018   PRIMARY LEARNER Patient   HIGHEST LEVEL OF EDUCATION - PRIMARY LEARNER  4 YEARS OF COLLEGE   BARRIERS PRIMARY LEARNER NONE   CO-LEARNER CAREGIVER No   PRIMARY LANGUAGE ENGLISH    NEED No   LEARNER PREFERENCE PRIMARY READING   LEARNING SPECIAL TOPICS none   ANSWERED BY patient   RELATIONSHIP SELF   ASSESSMENT COMMENT n/a     Depression Screening:     3 most recent PHQ Screens 2020   Little interest or pleasure in doing things Not at all   Feeling down, depressed, irritable, or hopeless Not at all   Total Score PHQ 2 0     Fall Risk Assessment:     Fall Risk Assessment, last 12 mths 2018   Able to walk? Yes   Fall in past 12 months? No     Abuse Screening:     Abuse Screening Questionnaire 2018   Do you ever feel afraid of your partner? N   Are you in a relationship with someone who physically or mentally threatens you? N   Is it safe for you to go home? Y     Coordination of Care Questionaire:   1. Have you been to the ER, urgent care clinic since your last visit? Hospitalized since your last visit? YES, [facility] two times last month for sinus infection    2. Have you seen or consulted any other health care providers outside of the [address] [place] [address] since your last visit? Include any pap smears or colon screening. NO    Advanced Directive:   1. Do you have an Advanced Directive? NO    2. Would you like information on Advanced Directives?  NO

## 2020-02-04 NOTE — PROGRESS NOTES
HISTORY OF PRESENT ILLNESS  Jani Hernandez is a 35 y.o. female. Cold Symptoms   The history is provided by the patient and medical records. Associated symptoms include chills, headaches and myalgias. Pertinent negatives include no chest pain, no ear pain and no sore throat. Mr#: [id number]      Patient Active Problem List   Diagnosis Code    Hypertension in pregnancy, preeclampsia/eclampsia/prior HTN [id number]    Pure hypercholesterolemia E78.00    IUD (intrauterine device) in place Z97.5         Current Outpatient Medications:     levonorgestrel (MIRENA) 20 mcg/24 hr (5 years) IUD, 1 Device by IntraUTERine route once., Disp: , Rfl:     sunitha's wort 300 mg cap, Take  by mouth., Disp: , Rfl:     Omega-3 Fatty Acids (FISH OIL) 500 mg cap, Take  by mouth., Disp: , Rfl:     glucosamine-chondroitin (ARTHX) 500-400 mg cap, Take 1 Cap by mouth daily. , Disp: , Rfl:     pseudoeph/DM/guaifen/acetamin (VICKS DAYQUIL LIQUICAPS PO), Take  by mouth daily as needed. , Disp: , Rfl:      No Known Allergies      Review of Systems   Constitutional: Positive for chills and fever (101.3). HENT: Positive for sinus pain. Negative for ear pain and sore throat. Respiratory: Positive for cough. Negative for sputum production. Cardiovascular: Negative for chest pain and palpitations. Musculoskeletal: Positive for myalgias. Skin: Negative for rash. Neurological: Positive for headaches. Visit Vitals  /70 (BP 1 Location: Left arm, BP Patient Position: Sitting)   Pulse 79   Temp 98.2 °F (36.8 °C) (Oral)   Resp 14   Ht 5' 2\" (1.575 m)   Wt 177 lb 3.2 oz (80.4 kg)   SpO2 98%   BMI 32.41 kg/m²       Physical Exam  Vitals signs and nursing note reviewed. Constitutional:       Appearance: She is well-developed. HENT:      Head: Normocephalic.       Right Ear: Tympanic membrane and ear canal normal.      Left Ear: Tympanic membrane and ear canal normal.      Mouth/Throat:      Mouth: Mucous membranes are moist.      Pharynx: Oropharynx is clear. Eyes:      Extraocular Movements: Extraocular movements intact. Conjunctiva/sclera: Conjunctivae normal.   Neck:      Musculoskeletal: Neck supple. Cardiovascular:      Rate and Rhythm: Normal rate and regular rhythm. Heart sounds: Normal heart sounds. Pulmonary:      Effort: Pulmonary effort is normal.      Breath sounds: Normal breath sounds. Lymphadenopathy:      Cervical: No cervical adenopathy. Skin:     General: Skin is warm and dry. Neurological:      Mental Status: She is alert and oriented to person, place, and time. Psychiatric:         Behavior: Behavior normal.     Layo rapid flu positive for influenza type A    ASSESSMENT and PLAN    ICD-10-CM ICD-9-CM    1. Flu-like symptoms R68.89 780.99 AMB POC LAYO INFLUENZA A/B TEST   2. Influenza J11.1 487.1 oseltamivir (TAMIFLU) 75 mg capsule   Rest, increase fluids, Take OTC acetaminophen and/or ibuprofen as needed for pain and fever. Do not take aspirin. Take oseltamivir twice daily x 5 days  Follow-up for new symptoms worsening symptoms or failure to improve.

## 2020-02-04 NOTE — LETTER
NOTIFICATION RETURN TO WORK / SCHOOL 
 
2/4/2020 3:55 PM 
 
Ms. Stacie Arguello [address] [address] To Whom It May Concern: 
 
Stacie Arguello is currently under the care of [address] [place] [address]. She will return to work/school on: 2/10/2020 If there are questions or concerns please have the patient contact our office. Sincerely, Sona Salas MD

## 2020-07-09 ENCOUNTER — TELEPHONE (OUTPATIENT)
Dept: FAMILY MEDICINE CLINIC | Age: 40
End: 2020-07-09

## 2020-07-09 NOTE — TELEPHONE ENCOUNTER
Please schedule vv or cpe in person for paperwork she wanted completed.   i Haven't seen her since 2/2019

## 2020-07-16 ENCOUNTER — VIRTUAL VISIT (OUTPATIENT)
Dept: FAMILY MEDICINE CLINIC | Age: 40
End: 2020-07-16

## 2020-07-16 DIAGNOSIS — E78.00 PURE HYPERCHOLESTEROLEMIA: Primary | ICD-10-CM

## 2020-07-16 NOTE — PATIENT INSTRUCTIONS
[facility]  Dr. Jessica Fisher  [phone] [facility].   [facility], [address] [place] [address]

## 2020-07-16 NOTE — PROGRESS NOTES
Ryanne Mejía is a 35 y.o. female who was seen by synchronous (real-time) audio-video technology on 7/16/2020 for Follow-up      Assessment & Plan:   Diagnoses and all orders for this visit:    1. Pure hypercholesterolemia          Subjective:     Here for f/u. Feels well. Declines labs at this time. Works in . Needs form completed. No cough, night sweats or exposure to TB. Does note a bump on upper inner eyelid. Irritating. No contact lens use. There for past 5 days. Prior to Admission medications    Medication Sig Start Date End Date Taking? Authorizing Provider   levonorgestrel (MIRENA) 20 mcg/24 hr (5 years) IUD 1 Device by IntraUTERine route once. Provider, Historical   sunitha's wort 300 mg cap Take  by mouth. Provider, Historical   Omega-3 Fatty Acids (FISH OIL) 500 mg cap Take  by mouth. Provider, Historical   glucosamine-chondroitin (ARTHX) 500-400 mg cap Take 1 Cap by mouth daily. Provider, Historical   pseudoeph/DM/guaifen/acetamin (VICKS DAYQUIL LIQUICAPS PO) Take  by mouth daily as needed. Provider, Historical     Patient Active Problem List   Diagnosis Code    Hypertension in pregnancy, preeclampsia/eclampsia/prior HTN [id number]    Pure hypercholesterolemia E78.00    IUD (intrauterine device) in place Z97.5       Review of Systems   Respiratory: Negative. Cardiovascular: Negative. Negative for chest pain.        Objective:     Patient-Reported Vitals 7/16/2020   Patient-Reported Weight 178   Patient-Reported Height 52   Patient-Reported Temperature 97   Patient-Reported SpO2 Na   Patient-Reported Peak Flow Na   Patient-Reported LMP Na        [INSTRUCTIONS:  \"[x]\" Indicates a positive item  \"[]\" Indicates a negative item  -- DELETE ALL ITEMS NOT EXAMINED]    Constitutional: [x] Appears well-developed and well-nourished [x] No apparent distress      [] Abnormal -     Mental status: [x] Alert and awake  [x] Oriented to person/place/time [x] Able to follow commands [] Abnormal -     Eyes:   EOM    [x]  Normal    [] Abnormal -   Sclera  [x]  Normal    [] Abnormal -          Discharge [x]  None visible   [] Abnormal -     HENT: [x] Normocephalic, atraumatic  [] Abnormal -   [x] Mouth/Throat: Mucous membranes are moist    External Ears [x] Normal  [] Abnormal -    Neck: [x] No visualized mass [] Abnormal -     Pulmonary/Chest: [x] Respiratory effort normal   [x] No visualized signs of difficulty breathing or respiratory distress        [] Abnormal -      Musculoskeletal:   [] Normal gait with no signs of ataxia         [] Normal range of motion of neck        [] Abnormal -     Neurological:        [] No Facial Asymmetry (Cranial nerve 7 motor function) (limited exam due to video visit)          [] No gaze palsy        [] Abnormal -          Skin:        [] No significant exanthematous lesions or discoloration noted on facial skin         [] Abnormal -            Psychiatric:       [x] Normal Affect [] Abnormal -        [x] No Hallucinations    Other pertinent observable physical exam findings:-        We discussed the expected course, resolution and complications of the diagnosis(es) in detail. Medication risks, benefits, costs, interactions, and alternatives were discussed as indicated. I advised her to contact the office if her condition worsens, changes or fails to improve as anticipated. She expressed understanding with the diagnosis(es) and plan. Chon Collazo, who was evaluated through a patient-initiated, synchronous (real-time) audio-video encounter, and/or her healthcare decision maker, is aware that it is a billable service, with coverage as determined by her insurance carrier. She provided verbal consent to proceed: Yes, and patient identification was verified.  It was conducted pursuant to the emergency declaration under the [address] [facility] [address] waiver authority and the [employer] Appropriations Act. A caregiver was present when appropriate. Ability to conduct physical exam was limited. I was at home. The patient was at home.       Koko Benjamin MD

## 2021-07-20 ENCOUNTER — APPOINTMENT (OUTPATIENT)
Dept: FAMILY MEDICINE CLINIC | Age: 41
End: 2021-07-20

## 2021-07-20 ENCOUNTER — OFFICE VISIT (OUTPATIENT)
Dept: FAMILY MEDICINE CLINIC | Age: 41
End: 2021-07-20
Payer: COMMERCIAL

## 2021-07-20 VITALS
DIASTOLIC BLOOD PRESSURE: 74 MMHG | BODY MASS INDEX: 33.01 KG/M2 | SYSTOLIC BLOOD PRESSURE: 110 MMHG | HEIGHT: 62 IN | WEIGHT: 179.4 LBS | TEMPERATURE: 97.9 F | RESPIRATION RATE: 14 BRPM | OXYGEN SATURATION: 98 % | HEART RATE: 73 BPM

## 2021-07-20 DIAGNOSIS — Z00.00 ROUTINE GENERAL MEDICAL EXAMINATION AT A HEALTH CARE FACILITY: ICD-10-CM

## 2021-07-20 DIAGNOSIS — M54.2 POSTERIOR NECK PAIN: ICD-10-CM

## 2021-07-20 DIAGNOSIS — E78.00 PURE HYPERCHOLESTEROLEMIA: ICD-10-CM

## 2021-07-20 DIAGNOSIS — E66.9 OBESITY (BMI 30.0-34.9): ICD-10-CM

## 2021-07-20 DIAGNOSIS — R19.8 SYMPTOMS OF GASTROESOPHAGEAL REFLUX: ICD-10-CM

## 2021-07-20 DIAGNOSIS — Z00.00 ROUTINE GENERAL MEDICAL EXAMINATION AT A HEALTH CARE FACILITY: Primary | ICD-10-CM

## 2021-07-20 PROCEDURE — 99396 PREV VISIT EST AGE 40-64: CPT | Performed by: FAMILY MEDICINE

## 2021-07-20 RX ORDER — BISMUTH SUBSALICYLATE 262 MG
1 TABLET,CHEWABLE ORAL DAILY
COMMUNITY

## 2021-07-20 RX ORDER — FAMOTIDINE 40 MG/1
40 TABLET, FILM COATED ORAL DAILY
Qty: 90 TABLET | Refills: 3 | Status: SHIPPED | OUTPATIENT
Start: 2021-07-20

## 2021-07-20 NOTE — PATIENT INSTRUCTIONS
Lab studies ordered, further disposition pending lab results if indicated  Avoid dietary starch and sugar and follow program of regular aerobic exercise  GYN follow-up as per providers recommendation  Begin famotidine 40 mg daily for reflux symptoms, avoid foods and beverages containing citrus dairy caffeine and tomato and avoid eating within 3 hours of bedtime  For neck pain apply moist heat, follow exercise instructions take occasional OTC Tylenol or ibuprofen as needed  Return for new or worsening symptoms  Return for annual physical exam and follow-up in 1 year or sooner with any problems

## 2021-07-20 NOTE — PROGRESS NOTES
Cherelle Mckeon is a 38 y.o. female (: 1980) presenting to address:    Chief Complaint   Patient presents with    Physical       Vitals:    21 1133   BP: 110/74   Pulse: 73   Resp: 14   Temp: 97.9 °F (36.6 °C)   TempSrc: Temporal   SpO2: 98%   Weight: 179 lb 6.4 oz (81.4 kg)   Height: 5' 2\" (1.575 m)   PainSc:   0 - No pain       Hearing/Vision:   No exam data present    Learning Assessment:     Learning Assessment 2018   PRIMARY LEARNER Patient   HIGHEST LEVEL OF EDUCATION - PRIMARY LEARNER  4 YEARS OF COLLEGE   BARRIERS PRIMARY LEARNER NONE   CO-LEARNER CAREGIVER No   PRIMARY LANGUAGE ENGLISH    NEED No   LEARNER PREFERENCE PRIMARY READING   LEARNING SPECIAL TOPICS none   ANSWERED BY patient   RELATIONSHIP SELF   ASSESSMENT COMMENT n/a     Depression Screening:     3 most recent PHQ Screens 2021   Little interest or pleasure in doing things Not at all   Feeling down, depressed, irritable, or hopeless Not at all   Total Score PHQ 2 0     Fall Risk Assessment:     Fall Risk Assessment, last 12 mths 2018   Able to walk? Yes   Fall in past 12 months? No     Abuse Screening:     Abuse Screening Questionnaire 2018   Do you ever feel afraid of your partner? N   Are you in a relationship with someone who physically or mentally threatens you? N   Is it safe for you to go home? Y     Coordination of Care Questionaire:   1. Have you been to the ER, urgent care clinic since your last visit? Hospitalized since your last visit? YES, urgent care for sinus infection    2. Have you seen or consulted any other health care providers outside of the [address] [place] [address] since your last visit? Include any pap smears or colon screening. YES, fat injection removal    Advanced Directive:   1. Do you have an Advanced Directive? NO    2. Would you like information on Advanced Directives?  NO

## 2021-07-20 NOTE — PROGRESS NOTES
HISTORY OF PRESENT ILLNESS  Anais Rodrigues is a 38 y.o. female. She presents to establish care, for health assessment, preventative care and follow-up with a history of hyperlipidemia    Mr#: [id number]      Past Medical History:   Diagnosis Date    Hypertension in pregnancy, preeclampsia/eclampsia/prior HTN 2012    Preeclampsia 2012    Pure hypercholesterolemia 6/15/2016    Skin cancer screening 2012       Past Surgical History:   Procedure Laterality Date    HX  SECTION      HX GYN      c section       Family History   Problem Relation Age of Onset    Hypertension Father     Thyroid Disease Sister     Thyroid Disease Brother     Stroke Paternal Grandmother     Cancer Other         skin cancer       No Known Allergies    Social History     Tobacco Use   Smoking Status Never Smoker   Smokeless Tobacco Never Used       Social History     Substance and Sexual Activity   Alcohol Use Yes         Patient Active Problem List   Diagnosis Code    Hypertension in pregnancy, preeclampsia/eclampsia/prior HTN [id number]    Pure hypercholesterolemia E78.00    IUD (intrauterine device) in place Z97.5         Current Outpatient Medications:     multivitamin (ONE A DAY) tablet, Take 1 Tablet by mouth daily. , Disp: , Rfl:     levonorgestrel (MIRENA) 20 mcg/24 hr (5 years) IUD, 1 Device by IntraUTERine route once., Disp: , Rfl:     sunitha's wort 300 mg cap, Take  by mouth as needed. , Disp: , Rfl:     Omega-3 Fatty Acids (FISH OIL) 500 mg cap, Take  by mouth., Disp: , Rfl:     glucosamine-chondroitin (ARTHX) 500-400 mg cap, Take 1 Cap by mouth daily. , Disp: , Rfl:        Review of Systems   Constitutional: Negative for chills, fever and weight loss. HENT: Negative for congestion, ear pain, hearing loss and sore throat. Eyes: Negative for blurred vision and double vision. Respiratory: Negative for cough, shortness of breath and wheezing.     Cardiovascular: Negative for chest pain, palpitations and leg swelling. Gastrointestinal: Positive for heartburn ( Reports frequent heartburn, takes Tums). Negative for abdominal pain, blood in stool, constipation, diarrhea, melena, nausea and vomiting. Genitourinary: Negative for dysuria and urgency. Pap smear from 11/12/2020 with LSIL   Musculoskeletal: Positive for neck pain (posterior pain, cracking episodically x 1 month). Negative for joint pain and myalgias. Skin: Negative for itching and rash. Neurological: Negative for dizziness, tingling, sensory change, focal weakness and headaches. Endo/Heme/Allergies: Negative for environmental allergies. Psychiatric/Behavioral: Positive for depression. Negative for suicidal ideas. The patient is not nervous/anxious and does not have insomnia. Visit Vitals  /74 (BP 1 Location: Left upper arm, BP Patient Position: Sitting, BP Cuff Size: Adult)   Pulse 73   Temp 97.9 °F (36.6 °C) (Temporal)   Resp 14   Ht 5' 2\" (1.575 m)   Wt 179 lb 6.4 oz (81.4 kg)   SpO2 98%   BMI 32.81 kg/m²       Physical Exam  Vitals and nursing note reviewed. Constitutional:       General: She is not in acute distress. Appearance: Normal appearance. She is obese. She is not ill-appearing. HENT:      Head: Normocephalic. Right Ear: Tympanic membrane, ear canal and external ear normal.      Left Ear: Tympanic membrane, ear canal and external ear normal.   Eyes:      Extraocular Movements: Extraocular movements intact. Conjunctiva/sclera: Conjunctivae normal.      Pupils: Pupils are equal, round, and reactive to light. Neck:      Vascular: No carotid bruit. Cardiovascular:      Rate and Rhythm: Normal rate and regular rhythm. Heart sounds: Normal heart sounds. Pulmonary:      Effort: Pulmonary effort is normal.      Breath sounds: Normal breath sounds. Abdominal:      Palpations: Abdomen is soft. Tenderness: There is no abdominal tenderness.    Musculoskeletal:         General: No deformity. Cervical back: Normal range of motion and neck supple. No rigidity or tenderness. Right lower leg: No edema. Left lower leg: No edema. Lymphadenopathy:      Cervical: No cervical adenopathy. Skin:     General: Skin is warm and dry. Neurological:      Mental Status: She is alert and oriented to person, place, and time. Psychiatric:         Mood and Affect: Mood normal.         Behavior: Behavior normal.         ASSESSMENT and PLAN    ICD-10-CM ICD-9-CM    1. Routine general medical examination at a health care facility  Z00.00 V70.0 famotidine (PEPCID) 40 mg tablet      CBC WITH AUTOMATED DIFF      HEMOGLOBIN A1C WITH EAG      URINALYSIS W/ RFLX MICROSCOPIC      TSH 3RD GENERATION      METABOLIC PANEL, COMPREHENSIVE      LIPID PANEL   2. Pure hypercholesterolemia  E78.00 272.0 TSH 3RD GENERATION      LIPID PANEL   3. Symptoms of gastroesophageal reflux  R19.8 787.99 famotidine (PEPCID) 40 mg tablet   4. Posterior neck pain  M54.2 723.1    5. Obesity (BMI 30.0-34. 9)  E66.9 278.00    Assessment:  Satisfactory general health assessment  Hyperlipidemia status to be determined pending lab results  Reflux symptoms to be addressed  Mechanical neck discomfort  Obesity-stable    Health maintenance:  History of LSIL on Pap smear 11/12/2020-followed by GYN    Plan:  Lab studies ordered, further disposition pending lab results if indicated  Avoid dietary starch and sugar and follow program of regular aerobic exercise  GYN follow-up as per providers recommendation  Begin famotidine 40 mg daily for reflux symptoms, avoid foods and beverages containing citrus dairy caffeine and tomato and avoid eating within 3 hours of bedtime  For neck pain apply moist heat, follow exercise instructions take occasional OTC Tylenol or ibuprofen as needed  Return for new or worsening symptoms  Return for annual physical exam and follow-up in 1 year or sooner with any problems    Clive Rodrigues MD      PLEASE NOTE:   This document has been produced using voice recognition software. Unrecognized errors in transcription may be present.

## 2021-07-21 LAB
A-G RATIO,AGRAT: 2 RATIO (ref 1.1–2.6)
ABSOLUTE LYMPHOCYTE COUNT, 10803: 2.2 K/UL (ref 1–4.8)
ALBUMIN SERPL-MCNC: 4.5 G/DL (ref 3.5–5)
ALP SERPL-CCNC: 75 U/L (ref 25–115)
ALT SERPL-CCNC: 15 U/L (ref 5–40)
ANION GAP SERPL CALC-SCNC: 13 MMOL/L (ref 3–15)
AST SERPL W P-5'-P-CCNC: 17 U/L (ref 10–37)
AVG GLU, 10930: 96 MG/DL (ref 91–123)
BASOPHILS # BLD: 0 K/UL (ref 0–0.2)
BASOPHILS NFR BLD: 1 % (ref 0–2)
BILIRUB SERPL-MCNC: 0.6 MG/DL (ref 0.2–1.2)
BILIRUB UR QL: NEGATIVE
BUN SERPL-MCNC: 16 MG/DL (ref 6–22)
CALCIUM SERPL-MCNC: 10.2 MG/DL (ref 8.4–10.5)
CHLORIDE SERPL-SCNC: 103 MMOL/L (ref 98–110)
CHOLEST SERPL-MCNC: 257 MG/DL (ref 110–200)
CLARITY: ABNORMAL
CO2 SERPL-SCNC: 26 MMOL/L (ref 20–32)
COLOR UR: YELLOW
CREAT SERPL-MCNC: 0.8 MG/DL (ref 0.5–1.2)
EOSINOPHIL # BLD: 0.1 K/UL (ref 0–0.5)
EOSINOPHIL NFR BLD: 1 % (ref 0–6)
ERYTHROCYTE [DISTWIDTH] IN BLOOD BY AUTOMATED COUNT: 13 % (ref 10–15.5)
GFRAA, 66117: >60
GFRNA, 66118: >60
GLOBULIN,GLOB: 2.3 G/DL (ref 2–4)
GLUCOSE SERPL-MCNC: 85 MG/DL (ref 70–99)
GLUCOSE UR QL: NEGATIVE MG/DL
GRANULOCYTES,GRANS: 64 % (ref 40–75)
HBA1C MFR BLD HPLC: 5 % (ref 4.8–5.6)
HCT VFR BLD AUTO: 44.1 % (ref 35.1–46.5)
HDLC SERPL-MCNC: 3.3 MG/DL (ref 0–5)
HDLC SERPL-MCNC: 77 MG/DL
HGB BLD-MCNC: 13.8 G/DL (ref 11.7–15.5)
HGB UR QL STRIP: NEGATIVE
IMMATURE PLATELET FRACTION: 12.7 % (ref 1.1–7.1)
KETONES UR QL STRIP.AUTO: NEGATIVE MG/DL
LDL/HDL RATIO,LDHD: 2.1
LDLC SERPL CALC-MCNC: 158 MG/DL (ref 50–99)
LEUKOCYTE ESTERASE: NEGATIVE
LYMPHOCYTES, LYMLT: 29 % (ref 20–45)
MCH RBC QN AUTO: 31 PG (ref 26–34)
MCHC RBC AUTO-ENTMCNC: 31 G/DL (ref 31–36)
MCV RBC AUTO: 100 FL (ref 81–99)
MONOCYTES # BLD: 0.5 K/UL (ref 0.1–1)
MONOCYTES NFR BLD: 6 % (ref 3–12)
NEUTROPHILS # BLD AUTO: 4.9 K/UL (ref 1.8–7.7)
NITRITE UR QL STRIP.AUTO: NEGATIVE
NON-HDL CHOLESTEROL, 011976: 180 MG/DL
PH UR STRIP: 6 PH (ref 5–8)
PLATELET # BLD AUTO: 182 K/UL (ref 140–440)
PMV BLD AUTO: 13.4 FL (ref 9–13)
POTASSIUM SERPL-SCNC: 4 MMOL/L (ref 3.5–5.5)
PROT SERPL-MCNC: 6.8 G/DL (ref 6.4–8.3)
PROT UR QL STRIP: NEGATIVE MG/DL
RBC # BLD AUTO: 4.4 M/UL (ref 3.8–5.2)
SODIUM SERPL-SCNC: 142 MMOL/L (ref 133–145)
SP GR UR: 1.02 (ref 1–1.03)
TRIGL SERPL-MCNC: 111 MG/DL (ref 40–149)
TSH SERPL DL<=0.005 MIU/L-ACNC: 0.65 MCU/ML (ref 0.27–4.2)
URINE ASCORBIC ACID: ABNORMAL MG/DL
UROBILINOGEN UR STRIP-MCNC: <2 MG/DL
VLDLC SERPL CALC-MCNC: 22 MG/DL (ref 8–30)
WBC # BLD AUTO: 7.7 K/UL (ref 4–11)

## 2021-07-23 NOTE — PROGRESS NOTES
Please advise that lab results show no significant abnormalities other than elevated cholesterol levels. Because the HDL good cholesterol is so high it mitigates the adverse effect of the total and bad cholesterol levels.   There is no need to take medication to lower cholesterol but it would be important to avoid dietary starch and sugar and follow program of regular aerobic exercise

## 2023-09-26 DIAGNOSIS — Z00.00 ROUTINE HEALTH MAINTENANCE: Primary | ICD-10-CM

## 2023-09-26 DIAGNOSIS — E78.00 PURE HYPERCHOLESTEROLEMIA: ICD-10-CM

## 2023-09-26 ASSESSMENT — ENCOUNTER SYMPTOMS
DIARRHEA: 0
BLOOD IN STOOL: 0
ABDOMINAL PAIN: 0
SHORTNESS OF BREATH: 0
COUGH: 0
SORE THROAT: 0
ANAL BLEEDING: 0
CONSTIPATION: 0
WHEEZING: 0
VOMITING: 0
NAUSEA: 0

## 2023-09-27 ENCOUNTER — OFFICE VISIT (OUTPATIENT)
Dept: FAMILY MEDICINE CLINIC | Facility: CLINIC | Age: 43
End: 2023-09-27
Payer: COMMERCIAL

## 2023-09-27 VITALS
SYSTOLIC BLOOD PRESSURE: 110 MMHG | BODY MASS INDEX: 32.94 KG/M2 | HEART RATE: 79 BPM | RESPIRATION RATE: 16 BRPM | DIASTOLIC BLOOD PRESSURE: 82 MMHG | HEIGHT: 62 IN | TEMPERATURE: 98.2 F | OXYGEN SATURATION: 99 % | WEIGHT: 179 LBS

## 2023-09-27 DIAGNOSIS — E78.00 PURE HYPERCHOLESTEROLEMIA: ICD-10-CM

## 2023-09-27 DIAGNOSIS — R45.89 SYMPTOMS OF DEPRESSION: ICD-10-CM

## 2023-09-27 DIAGNOSIS — Z00.00 ROUTINE HEALTH MAINTENANCE: ICD-10-CM

## 2023-09-27 DIAGNOSIS — Z00.00 ROUTINE GENERAL MEDICAL EXAMINATION AT A HEALTH CARE FACILITY: Primary | ICD-10-CM

## 2023-09-27 DIAGNOSIS — Z23 NEED FOR PROPHYLACTIC VACCINATION AND INOCULATION AGAINST INFLUENZA: ICD-10-CM

## 2023-09-27 PROCEDURE — 90471 IMMUNIZATION ADMIN: CPT | Performed by: FAMILY MEDICINE

## 2023-09-27 PROCEDURE — 90472 IMMUNIZATION ADMIN EACH ADD: CPT | Performed by: FAMILY MEDICINE

## 2023-09-27 PROCEDURE — 90674 CCIIV4 VAC NO PRSV 0.5 ML IM: CPT | Performed by: FAMILY MEDICINE

## 2023-09-27 PROCEDURE — 99396 PREV VISIT EST AGE 40-64: CPT | Performed by: FAMILY MEDICINE

## 2023-09-27 PROCEDURE — 90746 HEPB VACCINE 3 DOSE ADULT IM: CPT | Performed by: FAMILY MEDICINE

## 2023-09-27 PROCEDURE — 99213 OFFICE O/P EST LOW 20 MIN: CPT | Performed by: FAMILY MEDICINE

## 2023-09-27 RX ORDER — BUPROPION HYDROCHLORIDE 150 MG/1
150 TABLET ORAL EVERY MORNING
Qty: 30 TABLET | Refills: 1 | Status: SHIPPED | OUTPATIENT
Start: 2023-09-27

## 2023-09-27 SDOH — ECONOMIC STABILITY: INCOME INSECURITY: HOW HARD IS IT FOR YOU TO PAY FOR THE VERY BASICS LIKE FOOD, HOUSING, MEDICAL CARE, AND HEATING?: NOT VERY HARD

## 2023-09-27 SDOH — ECONOMIC STABILITY: HOUSING INSECURITY
IN THE LAST 12 MONTHS, WAS THERE A TIME WHEN YOU DID NOT HAVE A STEADY PLACE TO SLEEP OR SLEPT IN A SHELTER (INCLUDING NOW)?: NO

## 2023-09-27 SDOH — ECONOMIC STABILITY: FOOD INSECURITY: WITHIN THE PAST 12 MONTHS, THE FOOD YOU BOUGHT JUST DIDN'T LAST AND YOU DIDN'T HAVE MONEY TO GET MORE.: NEVER TRUE

## 2023-09-27 SDOH — ECONOMIC STABILITY: FOOD INSECURITY: WITHIN THE PAST 12 MONTHS, YOU WORRIED THAT YOUR FOOD WOULD RUN OUT BEFORE YOU GOT MONEY TO BUY MORE.: NEVER TRUE

## 2023-09-27 ASSESSMENT — PATIENT HEALTH QUESTIONNAIRE - PHQ9
2. FEELING DOWN, DEPRESSED OR HOPELESS: 0
SUM OF ALL RESPONSES TO PHQ QUESTIONS 1-9: 0
SUM OF ALL RESPONSES TO PHQ9 QUESTIONS 1 & 2: 0
SUM OF ALL RESPONSES TO PHQ QUESTIONS 1-9: 0
SUM OF ALL RESPONSES TO PHQ QUESTIONS 1-9: 0
1. LITTLE INTEREST OR PLEASURE IN DOING THINGS: 0
SUM OF ALL RESPONSES TO PHQ QUESTIONS 1-9: 0

## 2023-09-27 ASSESSMENT — ENCOUNTER SYMPTOMS: BACK PAIN: 0

## 2023-09-27 NOTE — PATIENT INSTRUCTIONS
Current Status:  Symptoms of depression  Status of hyperlipidemia to be determined pending lab results    Health Maintenance Recommendations:  Keep current with COVID-19 immunization guidelines  Hepatitis B immunization series #2 Today #3 TBD  Influenza immunization-today  Mammogram-up to date, records requested  Routine hepatitis C screening    Plan:  Begin bupropion  mg daily in the morning  Return for follow-up in 3 weeks, return sooner with any problems  Lab studies ordered, further disposition pending lab results if indicated  Medication recommendations pending lab results  Avoid dietary starch and sugar and as much as possible follow program of regular aerobic exercise.   Return for annual physical exam and follow-up in 1 year, return sooner with any problems  Please arrive at least 15 minutes prior to your scheduled appointment time

## 2023-09-28 LAB
A/G RATIO: 2 RATIO (ref 1.1–2.6)
ALBUMIN SERPL-MCNC: 4.7 G/DL (ref 3.5–5)
ALP BLD-CCNC: 75 U/L (ref 25–115)
ALT SERPL-CCNC: 22 U/L (ref 5–40)
ANION GAP SERPL CALCULATED.3IONS-SCNC: 12 MMOL/L (ref 3–15)
AST SERPL-CCNC: 19 U/L (ref 10–37)
AVERAGE GLUCOSE: 101 MG/DL (ref 91–123)
BASOPHILS # BLD: 1 % (ref 0–2)
BASOPHILS ABSOLUTE: 0.1 K/UL (ref 0–0.2)
BILIRUB SERPL-MCNC: 0.4 MG/DL (ref 0.2–1.2)
BILIRUB SERPL-MCNC: NEGATIVE MG/DL
BLOOD: NEGATIVE
BUN BLDV-MCNC: 18 MG/DL (ref 6–22)
CALCIUM SERPL-MCNC: 9.7 MG/DL (ref 8.4–10.5)
CHLORIDE BLD-SCNC: 104 MMOL/L (ref 98–110)
CHOLESTEROL/HDL RATIO: 3.6 (ref 0–5)
CHOLESTEROL: 273 MG/DL (ref 110–200)
CLARITY: CLEAR
CO2: 27 MMOL/L (ref 20–32)
COLOR: YELLOW
CREAT SERPL-MCNC: 1 MG/DL (ref 0.5–1.2)
EOSINOPHIL # BLD: 1 % (ref 0–6)
EOSINOPHILS ABSOLUTE: 0.1 K/UL (ref 0–0.5)
GLOBULIN: 2.4 G/DL (ref 2–4)
GLOMERULAR FILTRATION RATE: >60 ML/MIN/1.73 SQ.M.
GLUCOSE: 144 MG/DL (ref 70–99)
GLUCOSE: NEGATIVE MG/DL
HBA1C MFR BLD: 5.2 % (ref 4.8–5.6)
HCT VFR BLD CALC: 40.9 % (ref 35.1–46.5)
HDLC SERPL-MCNC: 76 MG/DL
HEMOGLOBIN: 13 G/DL (ref 11.7–15.5)
HEPATITIS C ANTIBODY: NORMAL
KETONES, URINE: NEGATIVE MG/DL
LDL CHOLESTEROL CALCULATED: 165 MG/DL (ref 50–99)
LDL/HDL RATIO: 2.2
LEUKOCYTE ESTERASE, URINE: NEGATIVE
LYMPHOCYTES # BLD: 18 % (ref 20–45)
LYMPHOCYTES ABSOLUTE: 1.8 K/UL (ref 1–4.8)
MCH RBC QN AUTO: 30 PG (ref 26–34)
MCHC RBC AUTO-ENTMCNC: 32 G/DL (ref 31–36)
MCV RBC AUTO: 94 FL (ref 80–99)
MONOCYTES ABSOLUTE: 0.5 K/UL (ref 0.1–1)
MONOCYTES: 5 % (ref 3–12)
NEUTROPHILS ABSOLUTE: 7.5 K/UL (ref 1.8–7.7)
NEUTROPHILS: 75 % (ref 40–75)
NITRITE, URINE: NEGATIVE
NON-HDL CHOLESTEROL: 197 MG/DL
PDW BLD-RTO: 13.2 % (ref 10–15.5)
PH, URINE: 6 PH (ref 5–8)
PLATELET # BLD: 215 K/UL (ref 140–440)
PMV BLD AUTO: 13.6 FL (ref 9–13)
POTASSIUM SERPL-SCNC: 4 MMOL/L (ref 3.5–5.5)
PROTEIN UA: NEGATIVE MG/DL
RBC: 4.34 M/UL (ref 3.8–5.2)
SODIUM BLD-SCNC: 143 MMOL/L (ref 133–145)
SPECIFIC GRAVITY: 1.02 (ref 1–1.03)
TOTAL PROTEIN: 7.1 G/DL (ref 6.4–8.3)
TRIGL SERPL-MCNC: 160 MG/DL (ref 40–149)
TSH SERPL DL<=0.05 MIU/L-ACNC: 0.42 MCU/ML (ref 0.27–4.2)
UROBILINOGEN: 0.2 MG/DL
VLDLC SERPL CALC-MCNC: 32 MG/DL (ref 8–30)
WBC: 9.9 K/UL (ref 4–11)

## 2023-10-17 NOTE — PROGRESS NOTES
HISTORY OF PRESENT ILLNESS  Jodie Cleaning  is a 39 y.o. y.o. female    She returns for follow-up after evaluation for her annual physical exam 3 weeks ago at which time she reported symptoms of feeling overwhelmed, some difficulty in motivation toward getting started in the morning. She was started on bupropion  mg daily. Lab studies were obtained to include follow-up of hyperlipidemia.             Mr#: [id number]      Past Medical History:   Diagnosis Date    Hypertension in pregnancy, preeclampsia/eclampsia/prior HTN 2012    Preeclampsia     Pure hypercholesterolemia 6/15/2016    Skin cancer screening 2012       Past Surgical History:   Procedure Laterality Date     SECTION      GYN      c section       Family History   Problem Relation Age of Onset    Cancer Other         skin cancer    Stroke Paternal Grandmother     Hypertension Father     Thyroid Disease Brother     Thyroid Disease Sister        No Known Allergies    Social History     Tobacco Use   Smoking Status Never   Smokeless Tobacco Never       Social History     Substance and Sexual Activity   Alcohol Use Yes       Immunization History   Administered Date(s) Administered    COVID-19, PFIZER PURPLE top, DILUTE for use, (age 15 y+), 30mcg/0.3mL 2021, 2021, 01/10/2022    Hep B, ENGERIX-B, (age 20y+), IM, 1mL 2023    Hepatitis B vaccine 2012    Influenza Trivalent 2012, 2014    Influenza Virus Vaccine 2011    Influenza, FLUCELVAX, (age 10 mo+), MDCK, MDV, 0.5mL 2018    Influenza, FLUCELVAX, (age 10 mo+), MDCK, PF, 0.5mL 10/01/2019, 2023    Influenza, Triv, inactivated, subunit, adjuvanted, IM (Fluad 72 yrs and older) 2018    TDaP, ADACEL (age 6y-58y), [facility] (age 10y+), IM, 0.5mL 2011, 2014, 2014    Td vaccine (adult) 2010    Tst, Unspecified Formulation 2004       Patient Active Problem List   Diagnosis    IUD (intrauterine device) in

## 2023-10-18 ENCOUNTER — OFFICE VISIT (OUTPATIENT)
Dept: FAMILY MEDICINE CLINIC | Facility: CLINIC | Age: 43
End: 2023-10-18
Payer: COMMERCIAL

## 2023-10-18 VITALS
DIASTOLIC BLOOD PRESSURE: 90 MMHG | SYSTOLIC BLOOD PRESSURE: 128 MMHG | WEIGHT: 176 LBS | OXYGEN SATURATION: 98 % | TEMPERATURE: 98.1 F | HEART RATE: 84 BPM | RESPIRATION RATE: 16 BRPM | HEIGHT: 62 IN | BODY MASS INDEX: 32.39 KG/M2

## 2023-10-18 DIAGNOSIS — R45.89 SYMPTOMS OF DEPRESSION: Primary | ICD-10-CM

## 2023-10-18 DIAGNOSIS — R03.0 ELEVATED BLOOD-PRESSURE READING WITHOUT DIAGNOSIS OF HYPERTENSION: ICD-10-CM

## 2023-10-18 PROCEDURE — 99213 OFFICE O/P EST LOW 20 MIN: CPT | Performed by: FAMILY MEDICINE

## 2023-10-18 RX ORDER — BUPROPION HYDROCHLORIDE 150 MG/1
150 TABLET ORAL EVERY MORNING
Qty: 100 TABLET | Refills: 3 | Status: SHIPPED | OUTPATIENT
Start: 2023-10-18

## 2023-10-18 NOTE — PATIENT INSTRUCTIONS
Current Status:  Symptoms of depression significantly improved  Mildly elevated diastolic blood pressure-atypical stress today    Health Maintenance Recommendations:  Hepatitis B immunization #3 due in the next 1-6 months    Plan:  Continue current medications  Return for follow-up of elevated blood pressure and for hepatitis B immunization #3 in about 2 months, return sooner with any problems  Annual physical exam due after 9/27/2024  Please always arrive at least 15 minutes before your scheduled appointment time.

## 2024-01-19 ENCOUNTER — TELEPHONE (OUTPATIENT)
Dept: FAMILY MEDICINE CLINIC | Facility: CLINIC | Age: 44
End: 2024-01-19

## 2024-01-19 NOTE — TELEPHONE ENCOUNTER
I called patient received form I don't see a return fax number . Does she want to  or what number can I fax it to .

## 2024-03-27 ENCOUNTER — OFFICE VISIT (OUTPATIENT)
Dept: FAMILY MEDICINE CLINIC | Facility: CLINIC | Age: 44
End: 2024-03-27

## 2024-03-27 VITALS
HEART RATE: 74 BPM | DIASTOLIC BLOOD PRESSURE: 82 MMHG | TEMPERATURE: 97.9 F | WEIGHT: 174 LBS | SYSTOLIC BLOOD PRESSURE: 124 MMHG | RESPIRATION RATE: 16 BRPM | BODY MASS INDEX: 32.02 KG/M2 | OXYGEN SATURATION: 99 % | HEIGHT: 62 IN

## 2024-03-27 DIAGNOSIS — J06.9 VIRAL UPPER RESPIRATORY TRACT INFECTION WITH COUGH: Primary | ICD-10-CM

## 2024-03-27 PROCEDURE — 99213 OFFICE O/P EST LOW 20 MIN: CPT | Performed by: FAMILY MEDICINE

## 2024-03-27 ASSESSMENT — PATIENT HEALTH QUESTIONNAIRE - PHQ9
SUM OF ALL RESPONSES TO PHQ QUESTIONS 1-9: 0
2. FEELING DOWN, DEPRESSED OR HOPELESS: NOT AT ALL
SUM OF ALL RESPONSES TO PHQ9 QUESTIONS 1 & 2: 0
SUM OF ALL RESPONSES TO PHQ QUESTIONS 1-9: 0
SUM OF ALL RESPONSES TO PHQ QUESTIONS 1-9: 0
1. LITTLE INTEREST OR PLEASURE IN DOING THINGS: NOT AT ALL
SUM OF ALL RESPONSES TO PHQ QUESTIONS 1-9: 0

## 2024-03-27 ASSESSMENT — ENCOUNTER SYMPTOMS
COUGH: 1
VOICE CHANGE: 1

## 2024-03-27 NOTE — PROGRESS NOTES
Yolanda Gil is a 43 y.o. female (: 1980) presenting to address:    Chief Complaint   Patient presents with    Sinus Problem    Hoarse    Congestion    Cough      . Started in the middle of night Monday started cough worse Tuesday .        Vitals:    24 1434   BP: 124/82   Pulse: 74   Resp: 16   Temp: 97.9 °F (36.6 °C)   SpO2: 99%       \"Have you been to the ER, urgent care clinic since your last visit?  Hospitalized since your last visit?\"    NO    “Have you seen or consulted any other health care providers outside of [facility] since your last visit?”    NO

## 2024-03-27 NOTE — PATIENT INSTRUCTIONS
Current Status:  History/symptoms consistent with a viral upper respiratory infection with cough and also laryngitis, benign exam    Health Maintenance Recommendations:  Keep current with COVID-19 immunization guidelines    Plan:  Rest the voice  Stay well-hydrated  Over-the-counter antihistamine/decongestant/cough suppressants as needed  Report new or worsening symptoms  Please schedule a fasting lab appointment followed by an annual physical exam appointment after 9/27/2024  Please always arrive at least 15 minutes before your scheduled appointment time.

## 2024-03-27 NOTE — PROGRESS NOTES
HISTORY OF PRESENT ILLNESS  Yolanda Gil  is a 43 y.o. y.o. female    She reports cough congestion and hoarseness for 2 days.  She is starting to feel a little better today.  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  She denies fever or breathing difficulty.        Mr#: [id number]      Past Medical History:   Diagnosis Date    Hypertension in pregnancy, preeclampsia/eclampsia/prior HTN 2012    Preeclampsia     Pure hypercholesterolemia 6/15/2016    Skin cancer screening 2012       Past Surgical History:   Procedure Laterality Date     SECTION      GYN      c section       Family History   Problem Relation Age of Onset    Cancer Other         skin cancer    Stroke Paternal Grandmother     Hypertension Father     Thyroid Disease Brother     Thyroid Disease Sister        No Known Allergies    Social History     Tobacco Use   Smoking Status Never   Smokeless Tobacco Never       Social History     Substance and Sexual Activity   Alcohol Use Yes       Immunization History   Administered Date(s) Administered    COVID-19, PFIZER PURPLE top, DILUTE for use, (age 12 y+), 30mcg/0.3mL 2021, 2021, 01/10/2022    Hep B, ENGERIX-B, (age 20y+), IM, 1mL 2023, 2023    Hepatitis B vaccine 2012    Influenza Trivalent 2012, 2014    Influenza Virus Vaccine 2011    Influenza, FLUCELVAX, (age 6 mo+), MDCK, MDV, 0.5mL 2018    Influenza, FLUCELVAX, (age 6 mo+), MDCK, PF, 0.5mL 10/01/2019, 2023    Influenza, Triv, inactivated, subunit, adjuvanted, IM (Fluad 65 yrs and older) 2018    Meningococcal MPSV4 (Menomune) 1999    TDaP, ADACEL (age 10y-64y), BOOSTRIX (age 10y+), IM, 0.5mL 2011, 2014, 2014    Td vaccine (adult) 2010    Tst, Unspecified Formulation 2004       Patient Active Problem List   Diagnosis    IUD (intrauterine device) in place    Pure hypercholesterolemia         Current

## 2024-04-08 ENCOUNTER — OFFICE VISIT (OUTPATIENT)
Dept: FAMILY MEDICINE CLINIC | Facility: CLINIC | Age: 44
End: 2024-04-08
Payer: COMMERCIAL

## 2024-04-08 VITALS
HEIGHT: 62 IN | SYSTOLIC BLOOD PRESSURE: 130 MMHG | BODY MASS INDEX: 32.94 KG/M2 | OXYGEN SATURATION: 98 % | TEMPERATURE: 98.1 F | RESPIRATION RATE: 16 BRPM | DIASTOLIC BLOOD PRESSURE: 80 MMHG | WEIGHT: 179 LBS | HEART RATE: 74 BPM

## 2024-04-08 DIAGNOSIS — H60.502 ACUTE OTITIS EXTERNA OF LEFT EAR, UNSPECIFIED TYPE: ICD-10-CM

## 2024-04-08 DIAGNOSIS — H65.02 NON-RECURRENT ACUTE SEROUS OTITIS MEDIA OF LEFT EAR: Primary | ICD-10-CM

## 2024-04-08 PROCEDURE — 99213 OFFICE O/P EST LOW 20 MIN: CPT | Performed by: FAMILY MEDICINE

## 2024-04-08 RX ORDER — AMOXICILLIN 875 MG/1
875 TABLET, COATED ORAL 2 TIMES DAILY
Qty: 14 TABLET | Refills: 0 | Status: SHIPPED | OUTPATIENT
Start: 2024-04-08 | End: 2024-04-15

## 2024-04-08 RX ORDER — CIPROFLOXACIN AND DEXAMETHASONE 3; 1 MG/ML; MG/ML
SUSPENSION/ DROPS AURICULAR (OTIC)
Qty: 7.5 ML | Refills: 0 | Status: SHIPPED | OUTPATIENT
Start: 2024-04-08

## 2024-04-08 NOTE — PROGRESS NOTES
Yolanda Gil is a 43 y.o. female (: 1980) presenting to address:    Chief Complaint   Patient presents with    Otalgia     Left ear . Taking advil every 4 hours .       Vitals:    24 1137   BP: 130/80   Pulse: 74   Resp: 16   Temp: 98.1 °F (36.7 °C)   SpO2: 98%       \"Have you been to the ER, urgent care clinic since your last visit?  Hospitalized since your last visit?\"    NO    “Have you seen or consulted any other health care providers outside of [facility] since your last visit?”    NO             
every morning, Disp: 100 tablet, Rfl: 3    levonorgestrel (MIRENA) IUD 52 mg, 1 Device by IntraUTERine route once, Disp: , Rfl:       Review of Systems   HENT:  Positive for ear pain (Left).        /80   Pulse 74   Temp 98.1 °F (36.7 °C) (Temporal)   Resp 16   Ht 1.575 m (5' 2\")   Wt 81.2 kg (179 lb)   SpO2 98%   BMI 32.74 kg/m²     Physical Exam  Vitals and nursing note reviewed.   Constitutional:       General: She is not in acute distress.     Appearance: Normal appearance. She is not ill-appearing.   HENT:      Head: Normocephalic.      Right Ear: Tympanic membrane, ear canal and external ear normal.      Ears:      Comments: Left TM transparent with serous changes, canal with erythema and tenderness, discomfort on manipulation of the tragus and pinna     Mouth/Throat:      Mouth: Mucous membranes are moist.      Pharynx: Oropharynx is clear.   Eyes:      Extraocular Movements: Extraocular movements intact.   Cardiovascular:      Rate and Rhythm: Normal rate.   Pulmonary:      Effort: Pulmonary effort is normal.   Neurological:      Mental Status: She is alert.   Psychiatric:         Mood and Affect: Mood normal.         Behavior: Behavior normal.          ASSESSMENT and PLAN    1. Non-recurrent acute serous otitis media of left ear  -     amoxicillin (AMOXIL) 875 MG tablet; Take 1 tablet by mouth 2 times daily for 7 days, Disp-14 tablet, R-0Normal  2. Acute otitis externa of left ear, unspecified type  -     ciprofloxacin-dexAMETHasone (CIPRODEX) 0.3-0.1 % otic suspension; Instill 4 drops in the left ear twice daily x 7-10 daily, Disp-7.5 mL, R-0Normal      Current Status:  Symptoms and exam consistent with left-sided serous otitis and otitis externa    Health Maintenance Recommendations:  Keep current with COVID-19 immunization guidelines    Plan:  Frequent pressure equalization exercises  Amoxicillin 875 mg twice daily with food x 7 days  Cipro otic drops, 2 drops to the left ear twice daily x

## 2024-04-08 NOTE — PATIENT INSTRUCTIONS
Current Status:  Symptoms and exam consistent with left-sided serous otitis and otitis externa    Health Maintenance Recommendations:  Keep current with COVID-19 immunization guidelines    Plan:  Frequent pressure equalization exercises  Amoxicillin 875 mg twice daily with food x 7 days  Cipro otic drops, 2 drops to the left ear twice daily x 7-10 days  Follow-up for new symptoms, worsening symptoms or failure to improve  Return for previously scheduled lab and physical exam appointments

## 2024-10-06 DIAGNOSIS — Z00.00 ROUTINE HEALTH MAINTENANCE: Primary | ICD-10-CM

## 2024-10-06 DIAGNOSIS — E78.00 PURE HYPERCHOLESTEROLEMIA: ICD-10-CM

## 2024-10-11 ENCOUNTER — HOSPITAL ENCOUNTER (OUTPATIENT)
Facility: HOSPITAL | Age: 44
Setting detail: SPECIMEN
Discharge: HOME | End: 2024-10-14

## 2024-10-11 PROCEDURE — 99001 SPECIMEN HANDLING PT-LAB: CPT

## 2024-10-12 LAB
A/G RATIO: 2.2 RATIO (ref 1.1–2.6)
ALBUMIN: 4.6 G/DL (ref 3.5–5)
ALP BLD-CCNC: 64 U/L (ref 25–115)
ALT SERPL-CCNC: 20 U/L (ref 5–40)
ANION GAP SERPL CALCULATED.3IONS-SCNC: 9 MMOL/L (ref 3–15)
AST SERPL-CCNC: 18 U/L (ref 10–37)
BASOPHILS ABSOLUTE: 0.1 K/UL (ref 0–0.2)
BASOPHILS RELATIVE PERCENT: 1 % (ref 0–2)
BILIRUB SERPL-MCNC: 0.2 MG/DL (ref 0.2–1.2)
BILIRUB SERPL-MCNC: NEGATIVE MG/DL
BLOOD: NEGATIVE
BUN BLDV-MCNC: 19 MG/DL (ref 6–22)
CALCIUM SERPL-MCNC: 9.2 MG/DL (ref 8.4–10.5)
CHLORIDE BLD-SCNC: 103 MMOL/L (ref 98–110)
CHOLESTEROL, TOTAL: 234 MG/DL (ref 110–200)
CHOLESTEROL/HDL RATIO: 3 (ref 0–5)
CLARITY, UA: CLEAR
CO2: 27 MMOL/L (ref 20–32)
COLOR, UA: YELLOW
CREAT SERPL-MCNC: 0.7 MG/DL (ref 0.5–1.2)
EOSINOPHIL # BLD: 1 % (ref 0–6)
EOSINOPHILS ABSOLUTE: 0.1 K/UL (ref 0–0.5)
ESTIMATED AVERAGE GLUCOSE: 94 MG/DL (ref 91–123)
GFR, ESTIMATED: >60 ML/MIN/1.73 SQ.M.
GLOBULIN: 2.1 G/DL (ref 2–4)
GLUCOSE: 95 MG/DL (ref 70–99)
GLUCOSE: NEGATIVE MG/DL
HBA1C MFR BLD: 4.9 % (ref 4.8–5.6)
HCT VFR BLD CALC: 42.3 % (ref 35.1–48)
HDLC SERPL-MCNC: 79 MG/DL
HEMOGLOBIN: 13.4 G/DL (ref 11.7–16)
KETONES, URINE: NEGATIVE MG/DL
LDL CHOLESTEROL: 144 MG/DL (ref 50–99)
LDL/HDL RATIO: 1.8
LEUKOCYTE ESTERASE, URINE: NEGATIVE
LYMPHOCYTES # BLD: 24 % (ref 20–45)
LYMPHOCYTES ABSOLUTE: 2 K/UL (ref 1–4.8)
MCH RBC QN AUTO: 32 PG (ref 26–34)
MCHC RBC AUTO-ENTMCNC: 32 G/DL (ref 31–36)
MCV RBC AUTO: 99 FL (ref 80–99)
MONOCYTES ABSOLUTE: 0.7 K/UL (ref 0.1–1)
MONOCYTES: 8 % (ref 3–12)
NEUTROPHILS ABSOLUTE: 5.7 K/UL (ref 1.8–7.7)
NEUTROPHILS: 67 % (ref 40–75)
NITRITE, URINE: NEGATIVE
NON-HDL CHOLESTEROL: 155 MG/DL
PDW BLD-RTO: 13.1 % (ref 10–15.5)
PH, URINE: 6.5 PH (ref 5–8)
PLATELET # BLD: 208 K/UL (ref 140–440)
PMV BLD AUTO: 13.2 FL (ref 9–13)
POTASSIUM SERPL-SCNC: 4.8 MMOL/L (ref 3.5–5.5)
PROTEIN UA: NEGATIVE MG/DL
RBC # BLD: 4.26 M/UL (ref 3.8–5.2)
SODIUM BLD-SCNC: 139 MMOL/L (ref 133–145)
SPECIFIC GRAVITY UA: 1.02 (ref 1–1.03)
TOTAL PROTEIN: 6.7 G/DL (ref 6.4–8.3)
TRIGL SERPL-MCNC: 52 MG/DL (ref 40–149)
TSH SERPL DL<=0.05 MIU/L-ACNC: 0.9 MCU/ML (ref 0.27–4.2)
UROBILINOGEN, URINE: 0.2 MG/DL
VLDLC SERPL CALC-MCNC: 10 MG/DL (ref 8–30)
WBC # BLD: 8.5 K/UL (ref 4–11)

## 2024-10-13 LAB — SENTARA SPECIMEN COLLECTION: NORMAL

## 2024-10-18 ENCOUNTER — OFFICE VISIT (OUTPATIENT)
Dept: FAMILY MEDICINE CLINIC | Facility: CLINIC | Age: 44
End: 2024-10-18

## 2024-10-18 VITALS
BODY MASS INDEX: 25.95 KG/M2 | RESPIRATION RATE: 16 BRPM | DIASTOLIC BLOOD PRESSURE: 84 MMHG | SYSTOLIC BLOOD PRESSURE: 120 MMHG | HEART RATE: 67 BPM | WEIGHT: 141 LBS | TEMPERATURE: 98.2 F | OXYGEN SATURATION: 99 % | HEIGHT: 62 IN

## 2024-10-18 DIAGNOSIS — Z12.11 COLON CANCER SCREENING: ICD-10-CM

## 2024-10-18 DIAGNOSIS — E78.00 PURE HYPERCHOLESTEROLEMIA: ICD-10-CM

## 2024-10-18 DIAGNOSIS — Z12.31 BREAST CANCER SCREENING BY MAMMOGRAM: ICD-10-CM

## 2024-10-18 DIAGNOSIS — R45.89 SYMPTOMS OF DEPRESSION: ICD-10-CM

## 2024-10-18 DIAGNOSIS — Z00.00 ROUTINE GENERAL MEDICAL EXAMINATION AT A HEALTH CARE FACILITY: Primary | ICD-10-CM

## 2024-10-18 RX ORDER — BUPROPION HYDROCHLORIDE 150 MG/1
150 TABLET ORAL EVERY MORNING
Qty: 100 TABLET | Refills: 3 | Status: SHIPPED | OUTPATIENT
Start: 2024-10-18

## 2024-10-18 SDOH — ECONOMIC STABILITY: INCOME INSECURITY: HOW HARD IS IT FOR YOU TO PAY FOR THE VERY BASICS LIKE FOOD, HOUSING, MEDICAL CARE, AND HEATING?: NOT HARD AT ALL

## 2024-10-18 SDOH — ECONOMIC STABILITY: FOOD INSECURITY: WITHIN THE PAST 12 MONTHS, THE FOOD YOU BOUGHT JUST DIDN'T LAST AND YOU DIDN'T HAVE MONEY TO GET MORE.: NEVER TRUE

## 2024-10-18 SDOH — ECONOMIC STABILITY: FOOD INSECURITY: WITHIN THE PAST 12 MONTHS, YOU WORRIED THAT YOUR FOOD WOULD RUN OUT BEFORE YOU GOT MONEY TO BUY MORE.: NEVER TRUE

## 2024-10-18 ASSESSMENT — ENCOUNTER SYMPTOMS
COUGH: 0
DIARRHEA: 0
WHEEZING: 0
BLOOD IN STOOL: 0
NAUSEA: 0
ABDOMINAL PAIN: 0
SORE THROAT: 0
SHORTNESS OF BREATH: 0
CONSTIPATION: 0
VOMITING: 0
ANAL BLEEDING: 0

## 2024-10-18 NOTE — PROGRESS NOTES
Yolanda Gil is a 44 y.o. female (: 1980) presenting to address:    Chief Complaint   Patient presents with    Annual Exam    Immunizations     Would like flu shot        Vitals:    10/18/24 1041   BP: 120/84   Pulse: 67   Resp: 16   Temp: 98.2 °F (36.8 °C)   SpO2: 99%       \"Have you been to the ER, urgent care clinic since your last visit?  Hospitalized since your last visit?\"    NO    “Have you seen or consulted any other health care providers outside of [facility] since your last visit?”    NO    “Have you had a colorectal cancer screening such as a colonoscopy/FIT/Cologuard?    NO    No colonoscopy on file  No cologuard on file  No FIT/FOBT on file   No flexible sigmoidoscopy on file        Have you had a mammogram?”   NO    No breast cancer screening on file           
Medications:     buPROPion (WELLBUTRIN XL) 150 MG extended release tablet, Take 1 tablet by mouth every morning, Disp: 100 tablet, Rfl: 3    levonorgestrel (MIRENA) IUD 52 mg, 1 Device by IntraUTERine route once, Disp: , Rfl:       Review of Systems   Constitutional:  Negative for activity change, appetite change, fever and unexpected weight change.   HENT:  Negative for congestion, ear pain, hearing loss, sore throat and tinnitus.    Eyes:  Negative for visual disturbance.   Respiratory:  Negative for cough, shortness of breath and wheezing.    Cardiovascular:  Negative for chest pain, palpitations and leg swelling.   Gastrointestinal:  Negative for abdominal pain, anal bleeding, blood in stool, constipation, diarrhea, nausea and vomiting.   Genitourinary:  Negative for difficulty urinating, dysuria, hematuria and menstrual problem.   Musculoskeletal:  Negative for arthralgias and myalgias.   Skin:  Negative for rash.   Allergic/Immunologic: Positive for environmental allergies. Negative for food allergies.   Neurological:  Negative for dizziness, light-headedness, numbness and headaches.   Psychiatric/Behavioral:  Positive for dysphoric mood (improved on bupropion). Negative for suicidal ideas. The patient is not nervous/anxious.        /84   Pulse 67   Temp 98.2 °F (36.8 °C) (Temporal)   Resp 16   Ht 1.575 m (5' 2\")   Wt 64 kg (141 lb)   SpO2 99%   BMI 25.79 kg/m²     Physical Exam  Constitutional:       General: She is not in acute distress.     Appearance: Normal appearance. She is obese. She is not ill-appearing.      Comments: 39 pound intentional weight loss in the past 6 months attributed to attending the weight loss program which includes diet and exercise guidelines as well as oral drops.   HENT:      Head: Normocephalic.      Right Ear: Tympanic membrane, ear canal and external ear normal.      Left Ear: Tympanic membrane, ear canal and external ear normal.      Mouth/Throat:      Mouth:

## 2024-10-18 NOTE — PATIENT INSTRUCTIONS
Current Status:  Satisfactory lipid levels (10-year estimated cardiac/stroke risk 4.83%)  Symptoms of depression-doing well    Health Maintenance Recommendations:  Influenza immunization  COVID-19 immunization booster available at the pharmacy  Mammogram ordered [facility]  Colonoscopy-referred    Plan:  Continue bupropion  mg daily  Avoid dietary starch and sugar and as much as possible follow program of regular aerobic exercise.  Return for annual physical exam and follow-up in 1 year, return sooner with any problems  Please arrive at least 15 minutes prior to your scheduled appointment time

## 2024-12-03 PROBLEM — Z86.0101 HISTORY OF ADENOMATOUS POLYP OF COLON: Status: ACTIVE | Noted: 2024-12-03

## 2025-01-06 ENCOUNTER — OFFICE VISIT (OUTPATIENT)
Dept: FAMILY MEDICINE CLINIC | Facility: CLINIC | Age: 45
End: 2025-01-06
Payer: COMMERCIAL

## 2025-01-06 VITALS
OXYGEN SATURATION: 97 % | WEIGHT: 142 LBS | RESPIRATION RATE: 16 BRPM | HEART RATE: 74 BPM | BODY MASS INDEX: 26.13 KG/M2 | DIASTOLIC BLOOD PRESSURE: 84 MMHG | HEIGHT: 62 IN | SYSTOLIC BLOOD PRESSURE: 130 MMHG | TEMPERATURE: 98 F

## 2025-01-06 DIAGNOSIS — J06.9 URI WITH COUGH AND CONGESTION: Primary | ICD-10-CM

## 2025-01-06 PROCEDURE — 99213 OFFICE O/P EST LOW 20 MIN: CPT | Performed by: FAMILY MEDICINE

## 2025-01-06 RX ORDER — BENZONATATE 200 MG/1
200 CAPSULE ORAL 3 TIMES DAILY PRN
Qty: 30 CAPSULE | Refills: 1 | Status: SHIPPED | OUTPATIENT
Start: 2025-01-06

## 2025-01-06 RX ORDER — PREDNISONE 10 MG/1
TABLET ORAL
Qty: 1 EACH | Refills: 0 | Status: SHIPPED | OUTPATIENT
Start: 2025-01-06

## 2025-01-06 ASSESSMENT — PATIENT HEALTH QUESTIONNAIRE - PHQ9
SUM OF ALL RESPONSES TO PHQ QUESTIONS 1-9: 0
SUM OF ALL RESPONSES TO PHQ9 QUESTIONS 1 & 2: 0
2. FEELING DOWN, DEPRESSED OR HOPELESS: NOT AT ALL
1. LITTLE INTEREST OR PLEASURE IN DOING THINGS: NOT AT ALL

## 2025-01-06 ASSESSMENT — ENCOUNTER SYMPTOMS: COUGH: 1

## 2025-01-06 NOTE — PATIENT INSTRUCTIONS
Current Status:  Symptoms and exam consistent with a viral respiratory infection    Plan:  Benzonatate capsules 200 mg 3 times daily as needed for cough  Prednisone Dosepak as directed  Maintain hydration  Over-the-counter and analgesics as needed  Report new symptoms, worsening symptoms or failure to improve  Please always arrive at least 15 minutes before your scheduled appointment time.

## 2025-01-06 NOTE — PROGRESS NOTES
Yolanda Gil is a 44 y.o. female (: 1980) presenting to address:    Chief Complaint   Patient presents with    Sinus Problem     Friday night Saturday morning . Family has been on and off coughing since thanksgiving youngest son has walking pneumonia.     Cough    Chills       Vitals:    25 1441   BP: 130/84   Pulse: 74   Resp: 16   Temp: 98 °F (36.7 °C)   SpO2: 97%       \"Have you been to the ER, urgent care clinic since your last visit?  Hospitalized since your last visit?\"    NO    “Have you seen or consulted any other health care providers outside of [facility] since your last visit?”    NO       Have you had a mammogram?”   NO    No breast cancer screening on file           
R-1Normal  -     predniSONE 10 MG (21) TBPK; Follow Dosepak instructions, Disp-1 each, R-0Normal      Current Status:  Symptoms and exam consistent with a viral respiratory infection    Plan:  Benzonatate capsules 200 mg 3 times daily as needed for cough  Prednisone Dosepak as directed  Maintain hydration  Over-the-counter and analgesics as needed  Report new symptoms, worsening symptoms or failure to improve  Please always arrive at least 15 minutes before your scheduled appointment time.      Hola Reeves MD    Please Note:  This document has been produced using voice recognition software.  Unrecognized errors in transcription may be present.